# Patient Record
Sex: MALE | Race: WHITE | NOT HISPANIC OR LATINO | URBAN - METROPOLITAN AREA
[De-identification: names, ages, dates, MRNs, and addresses within clinical notes are randomized per-mention and may not be internally consistent; named-entity substitution may affect disease eponyms.]

---

## 2018-08-15 ENCOUNTER — OUTPATIENT (OUTPATIENT)
Dept: OUTPATIENT SERVICES | Facility: HOSPITAL | Age: 83
LOS: 1 days | Discharge: HOME | End: 2018-08-15
Payer: MEDICARE

## 2018-08-15 DIAGNOSIS — C34.90 MALIGNANT NEOPLASM OF UNSPECIFIED PART OF UNSPECIFIED BRONCHUS OR LUNG: ICD-10-CM

## 2018-08-15 PROBLEM — Z00.00 ENCOUNTER FOR PREVENTIVE HEALTH EXAMINATION: Status: ACTIVE | Noted: 2018-08-15

## 2018-08-16 ENCOUNTER — RESULT REVIEW (OUTPATIENT)
Age: 83
End: 2018-08-16

## 2018-08-16 ENCOUNTER — APPOINTMENT (OUTPATIENT)
Dept: HEMATOLOGY ONCOLOGY | Facility: CLINIC | Age: 83
End: 2018-08-16
Payer: MEDICARE

## 2018-08-16 VITALS
RESPIRATION RATE: 16 BRPM | HEART RATE: 77 BPM | OXYGEN SATURATION: 97 % | BODY MASS INDEX: 23.57 KG/M2 | SYSTOLIC BLOOD PRESSURE: 115 MMHG | TEMPERATURE: 98.5 F | WEIGHT: 173.99 LBS | DIASTOLIC BLOOD PRESSURE: 69 MMHG | HEIGHT: 72 IN

## 2018-08-16 DIAGNOSIS — Z85.828 PERSONAL HISTORY OF OTHER MALIGNANT NEOPLASM OF SKIN: ICD-10-CM

## 2018-08-16 DIAGNOSIS — Z78.9 OTHER SPECIFIED HEALTH STATUS: ICD-10-CM

## 2018-08-16 LAB
ALBUMIN SERPL ELPH-MCNC: 4.6 G/DL
ALP BLD-CCNC: 287 U/L
ALT SERPL-CCNC: 16 U/L
ANION GAP SERPL CALC-SCNC: 15 MMOL/L
AST SERPL-CCNC: 17 U/L
BASOPHILS # BLD AUTO: 0 K/UL — SIGNIFICANT CHANGE UP (ref 0–0.2)
BASOPHILS NFR BLD AUTO: 0.4 % — SIGNIFICANT CHANGE UP (ref 0–2)
BILIRUB SERPL-MCNC: 0.6 MG/DL
BUN SERPL-MCNC: 21 MG/DL
CALCIUM SERPL-MCNC: 9.8 MG/DL
CEA SERPL-MCNC: 18 NG/ML
CHLORIDE SERPL-SCNC: 102 MMOL/L
CO2 SERPL-SCNC: 25 MMOL/L
CREAT SERPL-MCNC: 1.01 MG/DL
EOSINOPHIL # BLD AUTO: 0.1 K/UL — SIGNIFICANT CHANGE UP (ref 0–0.5)
EOSINOPHIL NFR BLD AUTO: 1.4 % — SIGNIFICANT CHANGE UP (ref 0–6)
GLUCOSE SERPL-MCNC: 97 MG/DL
HCT VFR BLD CALC: 41.8 % — SIGNIFICANT CHANGE UP (ref 39–50)
HGB BLD-MCNC: 14.8 G/DL — SIGNIFICANT CHANGE UP (ref 13–17)
LYMPHOCYTES # BLD AUTO: 1.6 K/UL — SIGNIFICANT CHANGE UP (ref 1–3.3)
LYMPHOCYTES # BLD AUTO: 17.9 % — SIGNIFICANT CHANGE UP (ref 13–44)
MCHC RBC-ENTMCNC: 32.9 PG — SIGNIFICANT CHANGE UP (ref 27–34)
MCHC RBC-ENTMCNC: 35.4 G/DL — SIGNIFICANT CHANGE UP (ref 32–36)
MCV RBC AUTO: 93 FL — SIGNIFICANT CHANGE UP (ref 80–100)
MONOCYTES # BLD AUTO: 0.9 K/UL — SIGNIFICANT CHANGE UP (ref 0–0.9)
MONOCYTES NFR BLD AUTO: 10.1 % — SIGNIFICANT CHANGE UP (ref 2–14)
NEUTROPHILS # BLD AUTO: 6.1 K/UL — SIGNIFICANT CHANGE UP (ref 1.8–7.4)
NEUTROPHILS NFR BLD AUTO: 70.2 % — SIGNIFICANT CHANGE UP (ref 43–77)
PLATELET # BLD AUTO: 280 K/UL — SIGNIFICANT CHANGE UP (ref 150–400)
POTASSIUM SERPL-SCNC: 4.9 MMOL/L
PROT SERPL-MCNC: 7 G/DL
RBC # BLD: 4.49 M/UL — SIGNIFICANT CHANGE UP (ref 4.2–5.8)
RBC # FLD: 11.6 % — SIGNIFICANT CHANGE UP (ref 10.3–14.5)
SODIUM SERPL-SCNC: 142 MMOL/L
WBC # BLD: 8.7 K/UL — SIGNIFICANT CHANGE UP (ref 3.8–10.5)
WBC # FLD AUTO: 8.7 K/UL — SIGNIFICANT CHANGE UP (ref 3.8–10.5)

## 2018-08-16 PROCEDURE — 99205 OFFICE O/P NEW HI 60 MIN: CPT

## 2018-08-16 RX ORDER — IBUPROFEN 200 MG/1
TABLET, COATED ORAL
Refills: 0 | Status: ACTIVE | COMMUNITY

## 2018-08-16 RX ORDER — MULTIVIT-MIN/FA/LYCOPEN/LUTEIN .4-300-25
TABLET ORAL
Refills: 0 | Status: ACTIVE | COMMUNITY

## 2018-08-16 RX ORDER — AMLODIPINE BESYLATE 2.5 MG/1
2.5 TABLET ORAL
Refills: 0 | Status: ACTIVE | COMMUNITY

## 2018-08-21 ENCOUNTER — RESULT REVIEW (OUTPATIENT)
Age: 83
End: 2018-08-21

## 2018-08-21 PROCEDURE — 88321 CONSLTJ&REPRT SLD PREP ELSWR: CPT

## 2018-08-22 LAB — SURGICAL PATHOLOGY STUDY: SIGNIFICANT CHANGE UP

## 2018-08-23 ENCOUNTER — OUTPATIENT (OUTPATIENT)
Dept: OUTPATIENT SERVICES | Facility: HOSPITAL | Age: 83
LOS: 1 days | Discharge: HOME | End: 2018-08-23

## 2018-08-23 ENCOUNTER — APPOINTMENT (OUTPATIENT)
Dept: RADIATION ONCOLOGY | Facility: CLINIC | Age: 83
End: 2018-08-23
Payer: MEDICARE

## 2018-08-23 VITALS
BODY MASS INDEX: 23.57 KG/M2 | RESPIRATION RATE: 16 BRPM | SYSTOLIC BLOOD PRESSURE: 117 MMHG | HEIGHT: 72 IN | HEART RATE: 81 BPM | TEMPERATURE: 98.5 F | WEIGHT: 174 LBS | DIASTOLIC BLOOD PRESSURE: 75 MMHG | OXYGEN SATURATION: 96 %

## 2018-08-23 PROCEDURE — 99205 OFFICE O/P NEW HI 60 MIN: CPT | Mod: 25

## 2018-08-23 RX ORDER — ASPIRIN 81 MG
81 TABLET,CHEWABLE ORAL
Refills: 0 | Status: ACTIVE | COMMUNITY

## 2018-08-31 ENCOUNTER — LABORATORY RESULT (OUTPATIENT)
Age: 83
End: 2018-08-31

## 2018-08-31 ENCOUNTER — APPOINTMENT (OUTPATIENT)
Dept: HEMATOLOGY ONCOLOGY | Facility: CLINIC | Age: 83
End: 2018-08-31
Payer: MEDICARE

## 2018-08-31 ENCOUNTER — RESULT REVIEW (OUTPATIENT)
Age: 83
End: 2018-08-31

## 2018-08-31 ENCOUNTER — APPOINTMENT (OUTPATIENT)
Dept: DERMATOLOGY | Facility: CLINIC | Age: 83
End: 2018-08-31
Payer: MEDICARE

## 2018-08-31 VITALS
TEMPERATURE: 98 F | DIASTOLIC BLOOD PRESSURE: 69 MMHG | RESPIRATION RATE: 16 BRPM | HEART RATE: 89 BPM | OXYGEN SATURATION: 98 % | SYSTOLIC BLOOD PRESSURE: 127 MMHG | BODY MASS INDEX: 22.42 KG/M2 | WEIGHT: 165.32 LBS

## 2018-08-31 VITALS
WEIGHT: 165 LBS | DIASTOLIC BLOOD PRESSURE: 62 MMHG | SYSTOLIC BLOOD PRESSURE: 122 MMHG | BODY MASS INDEX: 22.35 KG/M2 | HEIGHT: 72 IN

## 2018-08-31 DIAGNOSIS — D48.5 NEOPLASM OF UNCERTAIN BEHAVIOR OF SKIN: ICD-10-CM

## 2018-08-31 LAB
BASOPHILS # BLD AUTO: 0 K/UL — SIGNIFICANT CHANGE UP (ref 0–0.2)
BASOPHILS NFR BLD AUTO: 0.7 % — SIGNIFICANT CHANGE UP (ref 0–2)
EOSINOPHIL # BLD AUTO: 0.2 K/UL — SIGNIFICANT CHANGE UP (ref 0–0.5)
EOSINOPHIL NFR BLD AUTO: 3.8 % — SIGNIFICANT CHANGE UP (ref 0–6)
HCT VFR BLD CALC: 42.2 % — SIGNIFICANT CHANGE UP (ref 39–50)
HGB BLD-MCNC: 14.2 G/DL — SIGNIFICANT CHANGE UP (ref 13–17)
LYMPHOCYTES # BLD AUTO: 1.4 K/UL — SIGNIFICANT CHANGE UP (ref 1–3.3)
LYMPHOCYTES # BLD AUTO: 27.8 % — SIGNIFICANT CHANGE UP (ref 13–44)
MCHC RBC-ENTMCNC: 31.7 PG — SIGNIFICANT CHANGE UP (ref 27–34)
MCHC RBC-ENTMCNC: 33.6 G/DL — SIGNIFICANT CHANGE UP (ref 32–36)
MCV RBC AUTO: 94.5 FL — SIGNIFICANT CHANGE UP (ref 80–100)
MONOCYTES # BLD AUTO: 0.6 K/UL — SIGNIFICANT CHANGE UP (ref 0–0.9)
MONOCYTES NFR BLD AUTO: 12.8 % — SIGNIFICANT CHANGE UP (ref 2–14)
NEUTROPHILS # BLD AUTO: 2.7 K/UL — SIGNIFICANT CHANGE UP (ref 1.8–7.4)
NEUTROPHILS NFR BLD AUTO: 54.8 % — SIGNIFICANT CHANGE UP (ref 43–77)
PLATELET # BLD AUTO: 173 K/UL — SIGNIFICANT CHANGE UP (ref 150–400)
RBC # BLD: 4.47 M/UL — SIGNIFICANT CHANGE UP (ref 4.2–5.8)
RBC # FLD: 11.8 % — SIGNIFICANT CHANGE UP (ref 10.3–14.5)
WBC # BLD: 5 K/UL — SIGNIFICANT CHANGE UP (ref 3.8–10.5)
WBC # FLD AUTO: 5 K/UL — SIGNIFICANT CHANGE UP (ref 3.8–10.5)

## 2018-08-31 PROCEDURE — 11100 BX SKIN SUBCUTANEOUS&/MUCOUS MEMBRANE 1 LESION: CPT | Mod: 59

## 2018-08-31 PROCEDURE — 17000 DESTRUCT PREMALG LESION: CPT

## 2018-08-31 PROCEDURE — 99215 OFFICE O/P EST HI 40 MIN: CPT

## 2018-08-31 PROCEDURE — 99202 OFFICE O/P NEW SF 15 MIN: CPT | Mod: 25

## 2018-08-31 PROCEDURE — 17003 DESTRUCT PREMALG LES 2-14: CPT

## 2018-08-31 RX ORDER — QUINAPRIL HYDROCHLORIDE 20 MG/1
20 TABLET, FILM COATED ORAL DAILY
Qty: 30 | Refills: 0 | Status: ACTIVE | COMMUNITY
Start: 2018-08-31 | End: 1900-01-01

## 2018-08-31 RX ORDER — FINASTERIDE 5 MG/1
5 TABLET, FILM COATED ORAL DAILY
Qty: 30 | Refills: 0 | Status: ACTIVE | COMMUNITY
Start: 2018-08-31 | End: 1900-01-01

## 2018-08-31 RX ORDER — TAMSULOSIN HYDROCHLORIDE 0.4 MG/1
0.4 CAPSULE ORAL
Qty: 30 | Refills: 0 | Status: ACTIVE | COMMUNITY
Start: 2018-08-31 | End: 1900-01-01

## 2018-08-31 RX ORDER — ATORVASTATIN CALCIUM 40 MG/1
40 TABLET, FILM COATED ORAL
Qty: 30 | Refills: 0 | Status: ACTIVE | COMMUNITY
Start: 1900-01-01 | End: 1900-01-01

## 2018-09-05 LAB
ALBUMIN SERPL ELPH-MCNC: 4.2 G/DL
ALP BLD-CCNC: 298 U/L
ALT SERPL-CCNC: 25 U/L
ANION GAP SERPL CALC-SCNC: 12 MMOL/L
AST SERPL-CCNC: 17 U/L
BILIRUB SERPL-MCNC: 0.4 MG/DL
BUN SERPL-MCNC: 23 MG/DL
CALCIUM SERPL-MCNC: 9.5 MG/DL
CEA SERPL-MCNC: 41.5 NG/ML
CHLORIDE SERPL-SCNC: 105 MMOL/L
CO2 SERPL-SCNC: 26 MMOL/L
CREAT SERPL-MCNC: 1.26 MG/DL
GLUCOSE SERPL-MCNC: 91 MG/DL
POTASSIUM SERPL-SCNC: 5 MMOL/L
PROT SERPL-MCNC: 6.8 G/DL
SODIUM SERPL-SCNC: 143 MMOL/L

## 2018-09-10 ENCOUNTER — RESULT REVIEW (OUTPATIENT)
Age: 83
End: 2018-09-10

## 2018-09-10 ENCOUNTER — APPOINTMENT (OUTPATIENT)
Dept: HEMATOLOGY ONCOLOGY | Facility: CLINIC | Age: 83
End: 2018-09-10

## 2018-09-10 LAB
BASOPHILS # BLD AUTO: 0 K/UL — SIGNIFICANT CHANGE UP (ref 0–0.2)
BASOPHILS NFR BLD AUTO: 0.2 % — SIGNIFICANT CHANGE UP (ref 0–2)
EOSINOPHIL # BLD AUTO: 0.6 K/UL — HIGH (ref 0–0.5)
EOSINOPHIL NFR BLD AUTO: 6.9 % — HIGH (ref 0–6)
HCT VFR BLD CALC: 39.8 % — SIGNIFICANT CHANGE UP (ref 39–50)
HGB BLD-MCNC: 13.2 G/DL — SIGNIFICANT CHANGE UP (ref 13–17)
LYMPHOCYTES # BLD AUTO: 0.8 K/UL — LOW (ref 1–3.3)
LYMPHOCYTES # BLD AUTO: 9.3 % — LOW (ref 13–44)
MCHC RBC-ENTMCNC: 31.3 PG — SIGNIFICANT CHANGE UP (ref 27–34)
MCHC RBC-ENTMCNC: 33.1 G/DL — SIGNIFICANT CHANGE UP (ref 32–36)
MCV RBC AUTO: 94.6 FL — SIGNIFICANT CHANGE UP (ref 80–100)
MONOCYTES # BLD AUTO: 1.2 K/UL — HIGH (ref 0–0.9)
MONOCYTES NFR BLD AUTO: 14.1 % — HIGH (ref 2–14)
NEUTROPHILS # BLD AUTO: 5.7 K/UL — SIGNIFICANT CHANGE UP (ref 1.8–7.4)
NEUTROPHILS NFR BLD AUTO: 69.5 % — SIGNIFICANT CHANGE UP (ref 43–77)
PLATELET # BLD AUTO: 166 K/UL — SIGNIFICANT CHANGE UP (ref 150–400)
RBC # BLD: 4.21 M/UL — SIGNIFICANT CHANGE UP (ref 4.2–5.8)
RBC # FLD: 11.8 % — SIGNIFICANT CHANGE UP (ref 10.3–14.5)
WBC # BLD: 8.2 K/UL — SIGNIFICANT CHANGE UP (ref 3.8–10.5)
WBC # FLD AUTO: 8.2 K/UL — SIGNIFICANT CHANGE UP (ref 3.8–10.5)

## 2018-09-13 LAB
ALBUMIN SERPL ELPH-MCNC: 4 G/DL
ALP BLD-CCNC: 231 U/L
ALT SERPL-CCNC: 23 U/L
ANION GAP SERPL CALC-SCNC: 13 MMOL/L
AST SERPL-CCNC: 16 U/L
BILIRUB SERPL-MCNC: 0.7 MG/DL
BUN SERPL-MCNC: 21 MG/DL
CALCIUM SERPL-MCNC: 9.1 MG/DL
CHLORIDE SERPL-SCNC: 101 MMOL/L
CO2 SERPL-SCNC: 25 MMOL/L
CREAT SERPL-MCNC: 1.31 MG/DL
GLUCOSE SERPL-MCNC: 105 MG/DL
POTASSIUM SERPL-SCNC: 4.7 MMOL/L
PROT SERPL-MCNC: 6.3 G/DL
SODIUM SERPL-SCNC: 139 MMOL/L

## 2018-09-17 ENCOUNTER — OUTPATIENT (OUTPATIENT)
Dept: OUTPATIENT SERVICES | Facility: HOSPITAL | Age: 83
LOS: 1 days | Discharge: HOME | End: 2018-09-17

## 2018-09-17 DIAGNOSIS — C34.90 MALIGNANT NEOPLASM OF UNSPECIFIED PART OF UNSPECIFIED BRONCHUS OR LUNG: ICD-10-CM

## 2018-09-21 ENCOUNTER — RESULT REVIEW (OUTPATIENT)
Age: 83
End: 2018-09-21

## 2018-09-21 ENCOUNTER — APPOINTMENT (OUTPATIENT)
Dept: HEMATOLOGY ONCOLOGY | Facility: CLINIC | Age: 83
End: 2018-09-21
Payer: MEDICARE

## 2018-09-21 VITALS
TEMPERATURE: 99.6 F | SYSTOLIC BLOOD PRESSURE: 128 MMHG | WEIGHT: 163.14 LBS | HEART RATE: 98 BPM | RESPIRATION RATE: 16 BRPM | BODY MASS INDEX: 22.13 KG/M2 | OXYGEN SATURATION: 97 % | DIASTOLIC BLOOD PRESSURE: 71 MMHG

## 2018-09-21 LAB
BASOPHILS # BLD AUTO: 0 K/UL — SIGNIFICANT CHANGE UP (ref 0–0.2)
BASOPHILS NFR BLD AUTO: 0.6 % — SIGNIFICANT CHANGE UP (ref 0–2)
EOSINOPHIL # BLD AUTO: 0.6 K/UL — HIGH (ref 0–0.5)
EOSINOPHIL NFR BLD AUTO: 6.3 % — HIGH (ref 0–6)
HCT VFR BLD CALC: 41.6 % — SIGNIFICANT CHANGE UP (ref 39–50)
HGB BLD-MCNC: 13.7 G/DL — SIGNIFICANT CHANGE UP (ref 13–17)
LYMPHOCYTES # BLD AUTO: 1.1 K/UL — SIGNIFICANT CHANGE UP (ref 1–3.3)
LYMPHOCYTES # BLD AUTO: 12.3 % — LOW (ref 13–44)
MCHC RBC-ENTMCNC: 31.1 PG — SIGNIFICANT CHANGE UP (ref 27–34)
MCHC RBC-ENTMCNC: 32.9 G/DL — SIGNIFICANT CHANGE UP (ref 32–36)
MCV RBC AUTO: 94.5 FL — SIGNIFICANT CHANGE UP (ref 80–100)
MONOCYTES # BLD AUTO: 1 K/UL — HIGH (ref 0–0.9)
MONOCYTES NFR BLD AUTO: 11.6 % — SIGNIFICANT CHANGE UP (ref 2–14)
NEUTROPHILS # BLD AUTO: 6.2 K/UL — SIGNIFICANT CHANGE UP (ref 1.8–7.4)
NEUTROPHILS NFR BLD AUTO: 69.3 % — SIGNIFICANT CHANGE UP (ref 43–77)
PLATELET # BLD AUTO: 217 K/UL — SIGNIFICANT CHANGE UP (ref 150–400)
RBC # BLD: 4.4 M/UL — SIGNIFICANT CHANGE UP (ref 4.2–5.8)
RBC # FLD: 11.7 % — SIGNIFICANT CHANGE UP (ref 10.3–14.5)
WBC # BLD: 9 K/UL — SIGNIFICANT CHANGE UP (ref 3.8–10.5)
WBC # FLD AUTO: 9 K/UL — SIGNIFICANT CHANGE UP (ref 3.8–10.5)

## 2018-09-21 PROCEDURE — 99214 OFFICE O/P EST MOD 30 MIN: CPT

## 2018-09-25 LAB
ALBUMIN SERPL ELPH-MCNC: 4 G/DL
ALP BLD-CCNC: 186 U/L
ALT SERPL-CCNC: 27 U/L
ANION GAP SERPL CALC-SCNC: 14 MMOL/L
AST SERPL-CCNC: 17 U/L
BILIRUB SERPL-MCNC: 0.5 MG/DL
BUN SERPL-MCNC: 16 MG/DL
CALCIUM SERPL-MCNC: 9.4 MG/DL
CEA SERPL-MCNC: 38.8 NG/ML
CHLORIDE SERPL-SCNC: 102 MMOL/L
CO2 SERPL-SCNC: 25 MMOL/L
CREAT SERPL-MCNC: 1.16 MG/DL
GLUCOSE SERPL-MCNC: 106 MG/DL
POTASSIUM SERPL-SCNC: 5.2 MMOL/L
PROT SERPL-MCNC: 7.2 G/DL
SODIUM SERPL-SCNC: 141 MMOL/L

## 2018-10-01 ENCOUNTER — APPOINTMENT (OUTPATIENT)
Dept: DERMATOLOGY | Facility: CLINIC | Age: 83
End: 2018-10-01
Payer: MEDICARE

## 2018-10-01 PROCEDURE — 17263 DSTRJ MAL LES T/A/L 2.1-3.0: CPT

## 2018-10-12 ENCOUNTER — RESULT REVIEW (OUTPATIENT)
Age: 83
End: 2018-10-12

## 2018-10-12 ENCOUNTER — APPOINTMENT (OUTPATIENT)
Dept: INFUSION THERAPY | Facility: HOSPITAL | Age: 83
End: 2018-10-12

## 2018-10-12 ENCOUNTER — LABORATORY RESULT (OUTPATIENT)
Age: 83
End: 2018-10-12

## 2018-10-12 ENCOUNTER — APPOINTMENT (OUTPATIENT)
Dept: HEMATOLOGY ONCOLOGY | Facility: CLINIC | Age: 83
End: 2018-10-12
Payer: MEDICARE

## 2018-10-12 VITALS
TEMPERATURE: 98.2 F | WEIGHT: 158.73 LBS | HEART RATE: 97 BPM | BODY MASS INDEX: 21.53 KG/M2 | DIASTOLIC BLOOD PRESSURE: 70 MMHG | RESPIRATION RATE: 16 BRPM | SYSTOLIC BLOOD PRESSURE: 120 MMHG | OXYGEN SATURATION: 98 %

## 2018-10-12 LAB
BASOPHILS # BLD AUTO: 0 K/UL — SIGNIFICANT CHANGE UP (ref 0–0.2)
BASOPHILS NFR BLD AUTO: 0.6 % — SIGNIFICANT CHANGE UP (ref 0–2)
BUN SERPL-MCNC: 29 MG/DL — HIGH (ref 7–23)
CA-I BLDA-SCNC: 1.23 MMOL/L — SIGNIFICANT CHANGE UP (ref 1.12–1.3)
CHLORIDE SERPL-SCNC: 103 MMOL/L — SIGNIFICANT CHANGE UP (ref 96–108)
CO2 SERPL-SCNC: 26 MMOL/L — SIGNIFICANT CHANGE UP (ref 22–31)
CREAT SERPL-MCNC: 1.2 MG/DL — SIGNIFICANT CHANGE UP (ref 0.5–1.3)
EOSINOPHIL # BLD AUTO: 0.1 K/UL — SIGNIFICANT CHANGE UP (ref 0–0.5)
EOSINOPHIL NFR BLD AUTO: 1.2 % — SIGNIFICANT CHANGE UP (ref 0–6)
GLUCOSE SERPL-MCNC: 117 MG/DL — HIGH (ref 70–99)
HCT VFR BLD CALC: 36.4 % — LOW (ref 39–50)
HGB BLD-MCNC: 12.4 G/DL — LOW (ref 13–17)
LYMPHOCYTES # BLD AUTO: 1.3 K/UL — SIGNIFICANT CHANGE UP (ref 1–3.3)
LYMPHOCYTES # BLD AUTO: 16.1 % — SIGNIFICANT CHANGE UP (ref 13–44)
MCHC RBC-ENTMCNC: 32.1 PG — SIGNIFICANT CHANGE UP (ref 27–34)
MCHC RBC-ENTMCNC: 34.1 G/DL — SIGNIFICANT CHANGE UP (ref 32–36)
MCV RBC AUTO: 94.1 FL — SIGNIFICANT CHANGE UP (ref 80–100)
MONOCYTES # BLD AUTO: 1.3 K/UL — HIGH (ref 0–0.9)
MONOCYTES NFR BLD AUTO: 15.5 % — HIGH (ref 2–14)
NEUTROPHILS # BLD AUTO: 5.4 K/UL — SIGNIFICANT CHANGE UP (ref 1.8–7.4)
NEUTROPHILS NFR BLD AUTO: 66.6 % — SIGNIFICANT CHANGE UP (ref 43–77)
PLATELET # BLD AUTO: 248 K/UL — SIGNIFICANT CHANGE UP (ref 150–400)
POTASSIUM SERPL-MCNC: 4.7 MMOL/L — SIGNIFICANT CHANGE UP (ref 3.5–5.3)
POTASSIUM SERPL-SCNC: 4.7 MMOL/L — SIGNIFICANT CHANGE UP (ref 3.5–5.3)
RBC # BLD: 3.87 M/UL — LOW (ref 4.2–5.8)
RBC # FLD: 12 % — SIGNIFICANT CHANGE UP (ref 10.3–14.5)
SODIUM SERPL-SCNC: 139 MMOL/L — SIGNIFICANT CHANGE UP (ref 135–145)
WBC # BLD: 8.1 K/UL — SIGNIFICANT CHANGE UP (ref 3.8–10.5)
WBC # FLD AUTO: 8.1 K/UL — SIGNIFICANT CHANGE UP (ref 3.8–10.5)

## 2018-10-12 PROCEDURE — 99214 OFFICE O/P EST MOD 30 MIN: CPT

## 2018-10-15 ENCOUNTER — APPOINTMENT (OUTPATIENT)
Dept: NUCLEAR MEDICINE | Facility: IMAGING CENTER | Age: 83
End: 2018-10-15
Payer: MEDICARE

## 2018-10-15 ENCOUNTER — OUTPATIENT (OUTPATIENT)
Dept: OUTPATIENT SERVICES | Facility: HOSPITAL | Age: 83
LOS: 1 days | End: 2018-10-15
Payer: MEDICARE

## 2018-10-15 DIAGNOSIS — C34.12 MALIGNANT NEOPLASM OF UPPER LOBE, LEFT BRONCHUS OR LUNG: ICD-10-CM

## 2018-10-15 DIAGNOSIS — E86.0 DEHYDRATION: ICD-10-CM

## 2018-10-15 PROCEDURE — 78815 PET IMAGE W/CT SKULL-THIGH: CPT | Mod: 26,PS

## 2018-10-15 PROCEDURE — 78815 PET IMAGE W/CT SKULL-THIGH: CPT

## 2018-10-15 PROCEDURE — A9552: CPT

## 2018-10-17 ENCOUNTER — APPOINTMENT (OUTPATIENT)
Dept: HEMATOLOGY ONCOLOGY | Facility: CLINIC | Age: 83
End: 2018-10-17
Payer: MEDICARE

## 2018-10-17 VITALS
TEMPERATURE: 98.1 F | WEIGHT: 163.14 LBS | HEART RATE: 82 BPM | BODY MASS INDEX: 22.13 KG/M2 | RESPIRATION RATE: 16 BRPM | SYSTOLIC BLOOD PRESSURE: 114 MMHG | DIASTOLIC BLOOD PRESSURE: 69 MMHG | OXYGEN SATURATION: 100 %

## 2018-10-17 PROCEDURE — 99215 OFFICE O/P EST HI 40 MIN: CPT

## 2018-10-18 ENCOUNTER — CHART COPY (OUTPATIENT)
Age: 83
End: 2018-10-18

## 2018-10-25 ENCOUNTER — FORM ENCOUNTER (OUTPATIENT)
Age: 83
End: 2018-10-25

## 2018-10-26 ENCOUNTER — APPOINTMENT (OUTPATIENT)
Dept: HEMATOLOGY ONCOLOGY | Facility: CLINIC | Age: 83
End: 2018-10-26

## 2018-10-26 ENCOUNTER — OUTPATIENT (OUTPATIENT)
Dept: OUTPATIENT SERVICES | Facility: HOSPITAL | Age: 83
LOS: 1 days | End: 2018-10-26
Payer: MEDICARE

## 2018-10-26 ENCOUNTER — APPOINTMENT (OUTPATIENT)
Dept: MRI IMAGING | Facility: CLINIC | Age: 83
End: 2018-10-26
Payer: MEDICARE

## 2018-10-26 DIAGNOSIS — C79.31 SECONDARY MALIGNANT NEOPLASM OF BRAIN: ICD-10-CM

## 2018-10-26 DIAGNOSIS — C79.49 SECONDARY MALIGNANT NEOPLASM OF OTHER PARTS OF NERVOUS SYSTEM: ICD-10-CM

## 2018-10-26 PROCEDURE — A9585: CPT

## 2018-10-26 PROCEDURE — 70553 MRI BRAIN STEM W/O & W/DYE: CPT | Mod: 26

## 2018-10-26 PROCEDURE — 70553 MRI BRAIN STEM W/O & W/DYE: CPT

## 2018-11-02 ENCOUNTER — OUTPATIENT (OUTPATIENT)
Dept: OUTPATIENT SERVICES | Facility: HOSPITAL | Age: 83
LOS: 1 days | Discharge: HOME | End: 2018-11-02

## 2018-11-02 DIAGNOSIS — C34.90 MALIGNANT NEOPLASM OF UNSPECIFIED PART OF UNSPECIFIED BRONCHUS OR LUNG: ICD-10-CM

## 2018-11-06 ENCOUNTER — APPOINTMENT (OUTPATIENT)
Dept: RADIATION ONCOLOGY | Facility: CLINIC | Age: 83
End: 2018-11-06
Payer: MEDICARE

## 2018-11-06 VITALS
DIASTOLIC BLOOD PRESSURE: 67 MMHG | WEIGHT: 163.91 LBS | OXYGEN SATURATION: 97 % | HEART RATE: 90 BPM | RESPIRATION RATE: 14 BRPM | SYSTOLIC BLOOD PRESSURE: 126 MMHG | TEMPERATURE: 98.24 F | BODY MASS INDEX: 22.23 KG/M2

## 2018-11-06 PROCEDURE — 99213 OFFICE O/P EST LOW 20 MIN: CPT

## 2018-11-06 RX ORDER — CODEINE SULFATE 15 MG/1
15 TABLET ORAL
Refills: 0 | Status: DISCONTINUED | COMMUNITY
Start: 2018-08-16 | End: 2018-11-06

## 2018-11-06 RX ORDER — AMLODIPINE BESYLATE 5 MG/1
5 TABLET ORAL DAILY
Qty: 30 | Refills: 0 | Status: DISCONTINUED | COMMUNITY
Start: 2018-08-31 | End: 2018-11-06

## 2018-11-06 NOTE — PHYSICAL EXAM
[No Focal Deficits] : no focal deficits [Normal] : oriented to person, place and time, the affect was normal, the mood was normal and not anxious [de-identified] : hoarseness [de-identified] : pain in the neck with neck flexion [de-identified] : ambulating with rolling walker [de-identified] : a 2 cm flesh colored, irregular, verrucous lesion on the scalp near prior excision site

## 2018-11-06 NOTE — REVIEW OF SYSTEMS
[Hoarseness] : hoarseness [Cough] : cough [Difficulty Walking] : difficulty walking [Disturbance Of Gait] : gait disturbance [Negative] : Genitourinary [Abdominal Pain] : no abdominal pain [Vomiting] : no vomiting [Constipation] : no constipation [Diarrhea] : no diarrhea [Muscle Weakness] : no muscle weakness [Confused] : no confusion [Dizziness] : no dizziness [Fainting] : no fainting [Easy Bleeding] : no tendency for easy bleeding [Easy Bruising] : no tendency for easy bruising [FreeTextEntry6] : cough improving [FreeTextEntry8] : urinary urgency [de-identified] : prior skin squamous cell on the scalp s/p excision  [de-identified] : balance still bad, has been going on for 5 years

## 2018-11-06 NOTE — REVIEW OF SYSTEMS
[Hoarseness] : hoarseness [Cough] : cough [Difficulty Walking] : difficulty walking [Disturbance Of Gait] : gait disturbance [Negative] : Genitourinary [Abdominal Pain] : no abdominal pain [Vomiting] : no vomiting [Constipation] : no constipation [Diarrhea] : no diarrhea [Muscle Weakness] : no muscle weakness [Confused] : no confusion [Dizziness] : no dizziness [Fainting] : no fainting [Easy Bleeding] : no tendency for easy bleeding [Easy Bruising] : no tendency for easy bruising [FreeTextEntry6] : cough improving [FreeTextEntry8] : urinary urgency [de-identified] : prior skin squamous cell on the scalp s/p excision  [de-identified] : balance still bad, has been going on for 5 years

## 2018-11-06 NOTE — HISTORY OF PRESENT ILLNESS
[FreeTextEntry1] : Mr. Durga Sebastian presents today for follow up. \par \par \par ONCOLOGY HISTORY\par Mr. 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 He presents today for consideration for radiation therapy. 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 \par \par CT of the neck on 7/6/18 showed a WILFREDO spiculated mass, 3.9 C 3.4 cm with extension into the mediastinum, likely impinging on the course of the left recurrent laryngeal nerve.  There was a lytic lesion at C7 with bulging of the posterior margin of the vertebral body suspicious for metastatic disease, extension into the spinal cord cannot be completely excluded. Question of a C3 lesion. \par \par Biopsy of a right iliac crest bone lesion on 8/3/18 showed metastatic adenocarcinoma consistent with lung primary. L58R mutation detected. EGFR mutation and TR53 mutation detected. \par \par PET scan 7/25/18 showed widespread tumor burden of PET avid bony metastasis. \par There was a 5 X 3 X 3 cm PET avid mass within the WILFREDO suspicious for a primary lung carcinoma. \par There were bulky hypermetabolic PET avid lymph nodes identified within the preaortic outline and the AP window suspicious for metabolic lymphadenopathy. \par There were at least 4 hypermetabolic mets identified within the liver. \par \par He underwent brain MRI on 8/14/18 which showed multiple supra and infratentorial enhancing lesions compatible with metastatic disease. At least 15 supratentorial lesions, some with areas of central necrosis. Largest in the right posterior frontal lobe measuring 1.1 cm in greatest diameter. Many with mild surrounding vasogenci edema. Infratentorially there were at least 6 enhancing lesions involving the cerebellar hemispheres with mild surrounding vasogenic edema. 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 \par At the time of initial consult he had not neurologic symptoms, and had recently started tagrisso. \par I planned to evaluate after initial treatmetn with tagrisso, which has good CNS penetration. \par He was advised to follow up with dermatology regarding his scalp lesions\par \par 11/6/18- Mr. Sebastian presents today for follow up, Since he saw us last he had a brief pause in osimertinib due to diarrhea. \par \par MRI 10/26/18 showed one small residual nodular enhancing focus along the peripheral righ tfrontal cortex consistent with brain mets. In comparison to prior exam 8/14/18 the multiple enhancing supra and infratentorial nodules have resolved. \par \par PET 10/15/18 showed there was interval decrease in size and metabolism of a left upper lobe lung mass and FDG-avid prevascular/AP window conglomerate lymphadenopathy, compatible with a partial response to interval therapy. \par  Near total resolution of multiple FDG-avid hepatic metastases. \par Several FDG-avid osseous metastases, markedly decreased in number and \par metabolism. \par New, mildly FDG-avid bilateral lower cervical, mediastinal and hilar \par lymph nodes are nonspecific. Cervical lymph nodes may be further evaluated \par with ultrasound-guided percutaneous needle biopsy. Resolution of previously \par seen hypermetabolic right external iliac lymph node. \par \par 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 He denies numbness or tingling. Notes swallowing has much improved. He has difficulty with balance, which he has had for around 4-5 years. This is is stable. \par Overall feeling much better from prior.

## 2018-11-06 NOTE — REASON FOR VISIT
[Consideration of Curative Therapy] : consideration of curative therapy for [Brain Metastasis] : brain metastasis [Lung Cancer] : lung cancer [Family Member] : family member

## 2018-11-06 NOTE — VITALS
[Maximal Pain Intensity: 0/10] : 0/10 [Least Pain Intensity: 0/10] : 0/10 [NoTreatment Scheduled] : no treatment scheduled [80: Normal activity with effort; some signs or symptoms of disease.] : 80: Normal activity with effort; some signs or symptoms of disease.  [ECOG Performance Status: 2 - Ambulatory and capable of all self care but unable to carry out any work activities] : Performance Status: 2 - Ambulatory and capable of all self care but unable to carry out any work activities. Up and about more than 50% of waking hours

## 2018-11-06 NOTE — PHYSICAL EXAM
[No Focal Deficits] : no focal deficits [Normal] : oriented to person, place and time, the affect was normal, the mood was normal and not anxious [de-identified] : hoarseness [de-identified] : pain in the neck with neck flexion [de-identified] : ambulating with rolling walker [de-identified] : a 2 cm flesh colored, irregular, verrucous lesion on the scalp near prior excision site

## 2018-11-13 ENCOUNTER — RESULT REVIEW (OUTPATIENT)
Age: 83
End: 2018-11-13

## 2018-11-13 ENCOUNTER — APPOINTMENT (OUTPATIENT)
Dept: HEMATOLOGY ONCOLOGY | Facility: CLINIC | Age: 83
End: 2018-11-13
Payer: MEDICARE

## 2018-11-13 VITALS
RESPIRATION RATE: 16 BRPM | DIASTOLIC BLOOD PRESSURE: 69 MMHG | TEMPERATURE: 98 F | OXYGEN SATURATION: 98 % | SYSTOLIC BLOOD PRESSURE: 126 MMHG | WEIGHT: 163.14 LBS | HEART RATE: 78 BPM | BODY MASS INDEX: 22.13 KG/M2

## 2018-11-13 LAB
ALBUMIN SERPL ELPH-MCNC: 3.9 G/DL
ALP BLD-CCNC: 107 U/L
ALT SERPL-CCNC: 30 U/L
ANION GAP SERPL CALC-SCNC: 10 MMOL/L
AST SERPL-CCNC: 20 U/L
BASOPHILS # BLD AUTO: 0 K/UL — SIGNIFICANT CHANGE UP (ref 0–0.2)
BASOPHILS NFR BLD AUTO: 0.6 % — SIGNIFICANT CHANGE UP (ref 0–2)
BILIRUB SERPL-MCNC: 0.4 MG/DL
BUN SERPL-MCNC: 13 MG/DL
CALCIUM SERPL-MCNC: 9 MG/DL
CHLORIDE SERPL-SCNC: 105 MMOL/L
CO2 SERPL-SCNC: 28 MMOL/L
CREAT SERPL-MCNC: 1.11 MG/DL
EOSINOPHIL # BLD AUTO: 0.1 K/UL — SIGNIFICANT CHANGE UP (ref 0–0.5)
EOSINOPHIL NFR BLD AUTO: 2.2 % — SIGNIFICANT CHANGE UP (ref 0–6)
GLUCOSE SERPL-MCNC: 116 MG/DL
HCT VFR BLD CALC: 39.7 % — SIGNIFICANT CHANGE UP (ref 39–50)
HGB BLD-MCNC: 13.3 G/DL — SIGNIFICANT CHANGE UP (ref 13–17)
LYMPHOCYTES # BLD AUTO: 1.2 K/UL — SIGNIFICANT CHANGE UP (ref 1–3.3)
LYMPHOCYTES # BLD AUTO: 26 % — SIGNIFICANT CHANGE UP (ref 13–44)
MAGNESIUM SERPL-MCNC: 2.1 MG/DL
MCHC RBC-ENTMCNC: 31.9 PG — SIGNIFICANT CHANGE UP (ref 27–34)
MCHC RBC-ENTMCNC: 33.4 G/DL — SIGNIFICANT CHANGE UP (ref 32–36)
MCV RBC AUTO: 95.6 FL — SIGNIFICANT CHANGE UP (ref 80–100)
MONOCYTES # BLD AUTO: 0.5 K/UL — SIGNIFICANT CHANGE UP (ref 0–0.9)
MONOCYTES NFR BLD AUTO: 11.1 % — SIGNIFICANT CHANGE UP (ref 2–14)
NEUTROPHILS # BLD AUTO: 2.9 K/UL — SIGNIFICANT CHANGE UP (ref 1.8–7.4)
NEUTROPHILS NFR BLD AUTO: 60.1 % — SIGNIFICANT CHANGE UP (ref 43–77)
PLATELET # BLD AUTO: 155 K/UL — SIGNIFICANT CHANGE UP (ref 150–400)
POTASSIUM SERPL-SCNC: 5 MMOL/L
PROT SERPL-MCNC: 6.6 G/DL
RBC # BLD: 4.16 M/UL — LOW (ref 4.2–5.8)
RBC # FLD: 13.1 % — SIGNIFICANT CHANGE UP (ref 10.3–14.5)
SODIUM SERPL-SCNC: 143 MMOL/L
WBC # BLD: 4.8 K/UL — SIGNIFICANT CHANGE UP (ref 3.8–10.5)
WBC # FLD AUTO: 4.8 K/UL — SIGNIFICANT CHANGE UP (ref 3.8–10.5)

## 2018-11-13 PROCEDURE — 99214 OFFICE O/P EST MOD 30 MIN: CPT

## 2018-11-20 ENCOUNTER — APPOINTMENT (OUTPATIENT)
Dept: DERMATOLOGY | Facility: CLINIC | Age: 83
End: 2018-11-20
Payer: MEDICARE

## 2018-11-20 VITALS — DIASTOLIC BLOOD PRESSURE: 64 MMHG | SYSTOLIC BLOOD PRESSURE: 110 MMHG

## 2018-11-20 DIAGNOSIS — D09.9 CARCINOMA IN SITU, UNSPECIFIED: ICD-10-CM

## 2018-11-20 DIAGNOSIS — L57.0 ACTINIC KERATOSIS: ICD-10-CM

## 2018-11-20 PROCEDURE — 99213 OFFICE O/P EST LOW 20 MIN: CPT | Mod: 25

## 2018-11-20 PROCEDURE — 17003 DESTRUCT PREMALG LES 2-14: CPT

## 2018-11-20 PROCEDURE — 17000 DESTRUCT PREMALG LESION: CPT

## 2018-12-03 ENCOUNTER — APPOINTMENT (OUTPATIENT)
Dept: DERMATOLOGY | Facility: CLINIC | Age: 83
End: 2018-12-03

## 2018-12-07 NOTE — RESULTS/DATA
[FreeTextEntry1] : -CT neck 7/6/18: Spiculated left upper lobe mass with likely mediastinal involvement may impinge upon a course of the recurrent laryngeal nerve. Likely C7 and possibly C3 metastatic lesions for which extension into the spinal canal cannot be excluded.\par \par -CT chest 7/11/18: Left apical lung mass measuring 4.2 cm with metastatic left hilar and mediastinal lymphadenopathy and liver and bony metastases. Left adrenal centimeter nodule. There is a subcentimeter satellite nodule the left upper lobe. There are additional subcentimeter bilateral pulmonary nodules.\par \par -PET CT 7/25/18: Left upper lobe 5 x 3 cm FDG avid mass suspicious for primary lung cancer. Widespread bony metastatic disease. Bulky hypermetabolic FDG avid lymph nodes in the thorax. At least for hypermetabolic metastases to the liver.\par \par -MRI brain 8/14/18: Multiple supra-and infratentorial enhancing lesions compatible for metastatic disease.\par \par -Echocardiogram 8/21/18: Normal LV size and systolic function. Moderately sclerotic valve with slightly elevated velocities do not meet criteria for clinically significant aortic stenosis. Mild aortic, mitral and tricuspid regurgitation.\par \par -PET/CT 10/15/18:  Abnormal FDG-PET/CT scan. \par 1. Compared to PET/CT dated 7/25/2018, there is interval decrease in size and metabolism of a left upper lobe lung mass and FDG-avid prevascular/AP window conglomerate lymphadenopathy, compatible with a partial response to interval therapy. \par 2. Near total resolution of multiple FDG-avid hepatic metastases. \par 3. Several FDG-avid osseous metastases, markedly decreased in number and metabolism. \par 4. New, mildly FDG-avid bilateral lower cervical, mediastinal and hilar lymph nodes are nonspecific. Cervical lymph nodes may be further evaluated with ultrasound-guided percutaneous needle biopsy. Resolution of previously seen hypermetabolic right external iliac lymph node. \par 5. Findings compatible with left vocal cord paralysis, unchanged. Please correlate clinically and with direct visualization, as indicated. \par 6. No new lesion. \par \par MRI Brain 10/26/18: one small residual nodular enhancing focus along the peripheral right frontal cortex consistent with brain metastases. In comparison to the prior \par exam of 8/14/2018 the multiple enhancing supra and infratentorial nodules have resolved consistent with response to treatment.

## 2018-12-07 NOTE — ASSESSMENT
[FreeTextEntry1] : Metastatic NSCLC with adenocarcinoma histology that contains an activating EGFR mutation. Began first line osimertinib on 8/21/18. Achieved AR.\par -Continue Osimertinib for as long as it benefits the patient\par -Will monitor the non-specific LN's in lower cervical, mediastinal/hilar regions on subsequent imaging \par -If diarrhea recurs, patient prefers Pepto Bismol prn; can consider Lomotil as patient reports Imodium caused too much constipation\par -Echocardiogram for late December: requisition given\par -Brain lesions with nice response to osimertinib. No RT required at this time. Continue f/u with Dr. Brian\par -I have recommended treatment with denosumab to reduce the risk of the skeletal related events. He reportedly received clearance from his dentist but may be pursuing dentures.  Will await until all dental procedures completed and healing has taken place before starting denosumab.  He will need to be on calcium + Vit D.\par -Vocal cord paralysis: consultation with  Dr. Dunn for evaluation for vocal cord injection.\par -Labs today\par -F/U in 1 month or sooner should problems arise; will repeat labs at that time. \par -Check out form given to patient with instructions for making appointments

## 2018-12-07 NOTE — PHYSICAL EXAM
[Ambulatory and capable of all self care but unable to carry out any work activities] : Status 2- Ambulatory and capable of all self care but unable to carry out any work activities. Up and about more than 50% of waking hours [Normal] : affect appropriate [de-identified] : No icterus  [de-identified] : MMM O/P clear  [de-identified] : Supple, No LAD  [de-identified] : Somewhat distant SAM B/L  [de-identified] : S1 S2 RRR  [de-identified] : No edema  [de-identified] : Soft NT/ND [de-identified] : No Spine/CVA tenderness

## 2018-12-07 NOTE — REASON FOR VISIT
[Spouse] : spouse [Family Member] : family member [Follow-Up Visit] : a follow-up [FreeTextEntry2] : Lung Cancer

## 2018-12-07 NOTE — HISTORY OF PRESENT ILLNESS
[Disease: _____________________] : Disease: [unfilled] [AJCC Stage: ____] : AJCC Stage: [unfilled] [de-identified] : The patient was in his usual state of health until roughly 3 months ago when he began to experience hoarseness that was gradually worsening. 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 He was sent for a CT scan of his neck that revealed a left upper lobe lung mass. A subsequent CT scan of his chest confirmed this lung mass with likely lymph node involvement and bony and liver metastases. He was sent for a PET/CT scan to complete his staging workup. A CT-guided biopsy of a right pelvic bone on 8/3/18 was positive for adenocarcinoma consistent with lung primary. His tumor tested positive for an activating EGFR mutation. MRI of his brain revealed metastatic disease.  Started first-line Osimertinib on 8/21/18.   [de-identified] : -Right iliac crest bone lesion the CT-guided core biopsy 8/3/18: Metastatic adenocarcinoma consistent with lung primary. IHC is positive for TTF-1 and Napsin-A.  Positive for EGFR L858R mutation in exon 21 and TP53.   [de-identified] : The patient began first line osimertinib on 8/21/18. Diarrhea has not recurred since last visit.  Continues osimertinib. No rash. Had upper denture fitted since last visit. He reports that he plans to have a bridge fitted for the lower jaw. He was given dental clearance however patient will not receive denosumab until after jaw manipulation for this procedure is completed. He states that he has been eating much better since obtaining the denture. His weight is stable.\par \par MRI Brain 10/26/18 with response  to treatment. No RT needed as per Dr. Brian.\par \par Has consultation scheduled with Dr. Dunn for possible vocal cord injection.\par

## 2018-12-13 ENCOUNTER — OUTPATIENT (OUTPATIENT)
Dept: OUTPATIENT SERVICES | Facility: HOSPITAL | Age: 83
LOS: 1 days | Discharge: HOME | End: 2018-12-13

## 2018-12-13 DIAGNOSIS — C34.90 MALIGNANT NEOPLASM OF UNSPECIFIED PART OF UNSPECIFIED BRONCHUS OR LUNG: ICD-10-CM

## 2018-12-18 ENCOUNTER — RESULT REVIEW (OUTPATIENT)
Age: 83
End: 2018-12-18

## 2018-12-18 ENCOUNTER — APPOINTMENT (OUTPATIENT)
Dept: HEMATOLOGY ONCOLOGY | Facility: CLINIC | Age: 83
End: 2018-12-18
Payer: MEDICARE

## 2018-12-18 VITALS
BODY MASS INDEX: 22.87 KG/M2 | HEART RATE: 82 BPM | WEIGHT: 168.65 LBS | TEMPERATURE: 98.2 F | DIASTOLIC BLOOD PRESSURE: 71 MMHG | OXYGEN SATURATION: 99 % | RESPIRATION RATE: 17 BRPM | SYSTOLIC BLOOD PRESSURE: 127 MMHG

## 2018-12-18 LAB
BASOPHILS # BLD AUTO: 0.1 K/UL — SIGNIFICANT CHANGE UP (ref 0–0.2)
BASOPHILS NFR BLD AUTO: 1 % — SIGNIFICANT CHANGE UP (ref 0–2)
EOSINOPHIL # BLD AUTO: 0.1 K/UL — SIGNIFICANT CHANGE UP (ref 0–0.5)
EOSINOPHIL NFR BLD AUTO: 1.9 % — SIGNIFICANT CHANGE UP (ref 0–6)
HCT VFR BLD CALC: 41.1 % — SIGNIFICANT CHANGE UP (ref 39–50)
HGB BLD-MCNC: 14.2 G/DL — SIGNIFICANT CHANGE UP (ref 13–17)
LYMPHOCYTES # BLD AUTO: 1.3 K/UL — SIGNIFICANT CHANGE UP (ref 1–3.3)
LYMPHOCYTES # BLD AUTO: 23 % — SIGNIFICANT CHANGE UP (ref 13–44)
MCHC RBC-ENTMCNC: 32.9 PG — SIGNIFICANT CHANGE UP (ref 27–34)
MCHC RBC-ENTMCNC: 34.5 G/DL — SIGNIFICANT CHANGE UP (ref 32–36)
MCV RBC AUTO: 95.1 FL — SIGNIFICANT CHANGE UP (ref 80–100)
MONOCYTES # BLD AUTO: 0.7 K/UL — SIGNIFICANT CHANGE UP (ref 0–0.9)
MONOCYTES NFR BLD AUTO: 12.8 % — SIGNIFICANT CHANGE UP (ref 2–14)
NEUTROPHILS # BLD AUTO: 3.5 K/UL — SIGNIFICANT CHANGE UP (ref 1.8–7.4)
NEUTROPHILS NFR BLD AUTO: 61.2 % — SIGNIFICANT CHANGE UP (ref 43–77)
PLATELET # BLD AUTO: 155 K/UL — SIGNIFICANT CHANGE UP (ref 150–400)
RBC # BLD: 4.32 M/UL — SIGNIFICANT CHANGE UP (ref 4.2–5.8)
RBC # FLD: 12.9 % — SIGNIFICANT CHANGE UP (ref 10.3–14.5)
WBC # BLD: 5.8 K/UL — SIGNIFICANT CHANGE UP (ref 3.8–10.5)
WBC # FLD AUTO: 5.8 K/UL — SIGNIFICANT CHANGE UP (ref 3.8–10.5)

## 2018-12-18 PROCEDURE — 99214 OFFICE O/P EST MOD 30 MIN: CPT

## 2018-12-18 RX ORDER — CLINDAMYCIN 1 G/10ML
1 GEL TOPICAL TWICE DAILY
Qty: 1 | Refills: 0 | Status: ACTIVE | COMMUNITY
Start: 2018-12-18 | End: 1900-01-01

## 2018-12-24 LAB
ALBUMIN SERPL ELPH-MCNC: 3.9 G/DL
ALP BLD-CCNC: 95 U/L
ALT SERPL-CCNC: 36 U/L
ANION GAP SERPL CALC-SCNC: 11 MMOL/L
AST SERPL-CCNC: 26 U/L
BILIRUB SERPL-MCNC: 0.5 MG/DL
BUN SERPL-MCNC: 13 MG/DL
CALCIUM SERPL-MCNC: 8.8 MG/DL
CEA SERPL-MCNC: 7 NG/ML
CHLORIDE SERPL-SCNC: 107 MMOL/L
CO2 SERPL-SCNC: 27 MMOL/L
CREAT SERPL-MCNC: 1.06 MG/DL
GLUCOSE SERPL-MCNC: 88 MG/DL
MAGNESIUM SERPL-MCNC: 2 MG/DL
POTASSIUM SERPL-SCNC: 4.4 MMOL/L
PROT SERPL-MCNC: 6.7 G/DL
SODIUM SERPL-SCNC: 145 MMOL/L

## 2019-01-18 ENCOUNTER — OUTPATIENT (OUTPATIENT)
Dept: OUTPATIENT SERVICES | Facility: HOSPITAL | Age: 84
LOS: 1 days | Discharge: HOME | End: 2019-01-18

## 2019-01-18 DIAGNOSIS — C34.90 MALIGNANT NEOPLASM OF UNSPECIFIED PART OF UNSPECIFIED BRONCHUS OR LUNG: ICD-10-CM

## 2019-01-22 ENCOUNTER — OUTPATIENT (OUTPATIENT)
Dept: OUTPATIENT SERVICES | Facility: HOSPITAL | Age: 84
LOS: 1 days | End: 2019-01-22
Payer: MEDICARE

## 2019-01-22 ENCOUNTER — APPOINTMENT (OUTPATIENT)
Dept: NUCLEAR MEDICINE | Facility: IMAGING CENTER | Age: 84
End: 2019-01-22
Payer: MEDICARE

## 2019-01-22 DIAGNOSIS — C34.12 MALIGNANT NEOPLASM OF UPPER LOBE, LEFT BRONCHUS OR LUNG: ICD-10-CM

## 2019-01-22 PROCEDURE — 78815 PET IMAGE W/CT SKULL-THIGH: CPT

## 2019-01-22 PROCEDURE — 78815 PET IMAGE W/CT SKULL-THIGH: CPT | Mod: 26,PS

## 2019-01-22 PROCEDURE — A9552: CPT

## 2019-01-25 ENCOUNTER — RESULT REVIEW (OUTPATIENT)
Age: 84
End: 2019-01-25

## 2019-01-25 ENCOUNTER — APPOINTMENT (OUTPATIENT)
Dept: HEMATOLOGY ONCOLOGY | Facility: CLINIC | Age: 84
End: 2019-01-25
Payer: MEDICARE

## 2019-01-25 VITALS
HEART RATE: 75 BPM | RESPIRATION RATE: 16 BRPM | SYSTOLIC BLOOD PRESSURE: 125 MMHG | DIASTOLIC BLOOD PRESSURE: 72 MMHG | WEIGHT: 163.14 LBS | BODY MASS INDEX: 22.13 KG/M2 | OXYGEN SATURATION: 99 % | TEMPERATURE: 98.1 F

## 2019-01-25 LAB
BASOPHILS # BLD AUTO: 0 K/UL — SIGNIFICANT CHANGE UP (ref 0–0.2)
BASOPHILS NFR BLD AUTO: 0.8 % — SIGNIFICANT CHANGE UP (ref 0–2)
EOSINOPHIL # BLD AUTO: 0.1 K/UL — SIGNIFICANT CHANGE UP (ref 0–0.5)
EOSINOPHIL NFR BLD AUTO: 1.9 % — SIGNIFICANT CHANGE UP (ref 0–6)
HCT VFR BLD CALC: 42.1 % — SIGNIFICANT CHANGE UP (ref 39–50)
HGB BLD-MCNC: 14.3 G/DL — SIGNIFICANT CHANGE UP (ref 13–17)
LYMPHOCYTES # BLD AUTO: 1.5 K/UL — SIGNIFICANT CHANGE UP (ref 1–3.3)
LYMPHOCYTES # BLD AUTO: 29.7 % — SIGNIFICANT CHANGE UP (ref 13–44)
MCHC RBC-ENTMCNC: 32.2 PG — SIGNIFICANT CHANGE UP (ref 27–34)
MCHC RBC-ENTMCNC: 33.9 G/DL — SIGNIFICANT CHANGE UP (ref 32–36)
MCV RBC AUTO: 94.9 FL — SIGNIFICANT CHANGE UP (ref 80–100)
MONOCYTES # BLD AUTO: 0.7 K/UL — SIGNIFICANT CHANGE UP (ref 0–0.9)
MONOCYTES NFR BLD AUTO: 13.3 % — SIGNIFICANT CHANGE UP (ref 2–14)
NEUTROPHILS # BLD AUTO: 2.8 K/UL — SIGNIFICANT CHANGE UP (ref 1.8–7.4)
NEUTROPHILS NFR BLD AUTO: 54.3 % — SIGNIFICANT CHANGE UP (ref 43–77)
PLATELET # BLD AUTO: 157 K/UL — SIGNIFICANT CHANGE UP (ref 150–400)
RBC # BLD: 4.43 M/UL — SIGNIFICANT CHANGE UP (ref 4.2–5.8)
RBC # FLD: 12.4 % — SIGNIFICANT CHANGE UP (ref 10.3–14.5)
WBC # BLD: 5.1 K/UL — SIGNIFICANT CHANGE UP (ref 3.8–10.5)
WBC # FLD AUTO: 5.1 K/UL — SIGNIFICANT CHANGE UP (ref 3.8–10.5)

## 2019-01-25 PROCEDURE — 99214 OFFICE O/P EST MOD 30 MIN: CPT

## 2019-01-28 LAB
ALBUMIN SERPL ELPH-MCNC: 4.5 G/DL
ALP BLD-CCNC: 90 U/L
ALT SERPL-CCNC: 28 U/L
ANION GAP SERPL CALC-SCNC: 10 MMOL/L
AST SERPL-CCNC: 21 U/L
BILIRUB SERPL-MCNC: 0.4 MG/DL
BUN SERPL-MCNC: 21 MG/DL
CALCIUM SERPL-MCNC: 9.4 MG/DL
CEA SERPL-MCNC: 6.1 NG/ML
CHLORIDE SERPL-SCNC: 107 MMOL/L
CO2 SERPL-SCNC: 27 MMOL/L
CREAT SERPL-MCNC: 1.28 MG/DL
GLUCOSE SERPL-MCNC: 91 MG/DL
MAGNESIUM SERPL-MCNC: 2.1 MG/DL
POTASSIUM SERPL-SCNC: 4.8 MMOL/L
PROT SERPL-MCNC: 6.9 G/DL
SODIUM SERPL-SCNC: 144 MMOL/L

## 2019-01-31 ENCOUNTER — APPOINTMENT (OUTPATIENT)
Dept: OTOLARYNGOLOGY | Facility: CLINIC | Age: 84
End: 2019-01-31
Payer: MEDICARE

## 2019-01-31 VITALS — DIASTOLIC BLOOD PRESSURE: 77 MMHG | SYSTOLIC BLOOD PRESSURE: 125 MMHG | HEART RATE: 77 BPM

## 2019-01-31 DIAGNOSIS — R49.0 DYSPHONIA: ICD-10-CM

## 2019-01-31 PROCEDURE — 31579 LARYNGOSCOPY TELESCOPIC: CPT

## 2019-01-31 PROCEDURE — 99204 OFFICE O/P NEW MOD 45 MIN: CPT | Mod: 25

## 2019-01-31 NOTE — PHYSICAL EXAM
[Midline] : trachea located in midline position [Normal] : no rashes [de-identified] : severe CI AS

## 2019-01-31 NOTE — CONSULT LETTER
[Dear  ___] : Dear  [unfilled], [Consult Letter:] : I had the pleasure of evaluating your patient, [unfilled]. [Please see my note below.] : Please see my note below. [Consult Closing:] : Thank you very much for allowing me to participate in the care of this patient.  If you have any questions, please do not hesitate to contact me. [Sincerely,] : Sincerely, [FreeTextEntry3] : Walt Dunn MD, PhD\par Chief, Division of Laryngology\par Department of Otolaryngology\par [facility]\par Pediatric Otolaryngology, Health system\par  of Otolaryngology\par [facility] [employer]\par \par \par

## 2019-01-31 NOTE — HISTORY OF PRESENT ILLNESS
[de-identified] : 89yo M who started to have dysphonia in May. PCP concerned for LPR, but didn't resolve, saw OSH ENT who saw a polyp? and paralyzed VC. Granuloma resolved 1 month with PPI. Dysphonia persisted and unclear etiology of VC paralysis so he was sent for a neck CT scan, which subsequently found that he had a left upper lung mass. F/u CT's confirmed it was a LN in lung with mets to bones and liver. PET CT was done for staging and a CT guided bx of pelvic bone was consistent with adenocarcinoma with lung primary. Then MRI of the brain revealed mets disease to brain. Started chemo immediately after seeing heme onc here, which was 08/2018. Currently still on chemo. Had recent PET-CT, which showed marked improvement. Still shows VC paralysis on PET-CT. No SOB, breathing well. Denies dysphagia. Has not had any choking. Tolerates liquids. Has not lost voice completely where he cant speak at all. \par Did have few bronchitis episodes but no hospitalizations or PNA\par No aspiration symptoms, no h/o heartburn, no dysphagia, no hemoptysis, rhinorrhea, occasional epistaxis\par No globus sensation\par Only comfortable whispering\par   \par

## 2019-01-31 NOTE — REVIEW OF SYSTEMS
[Patient Intake Form Reviewed] : Patient intake form was reviewed [As Noted in HPI] : as noted in HPI [Hoarseness] : hoarseness [Negative] : Heme/Lymph

## 2019-02-01 ENCOUNTER — OUTPATIENT (OUTPATIENT)
Dept: OUTPATIENT SERVICES | Facility: HOSPITAL | Age: 84
LOS: 1 days | Discharge: HOME | End: 2019-02-01

## 2019-02-01 ENCOUNTER — APPOINTMENT (OUTPATIENT)
Dept: SPEECH THERAPY | Facility: CLINIC | Age: 84
End: 2019-02-01

## 2019-02-06 DIAGNOSIS — R13.13 DYSPHAGIA, PHARYNGEAL PHASE: ICD-10-CM

## 2019-02-13 ENCOUNTER — APPOINTMENT (OUTPATIENT)
Dept: OTOLARYNGOLOGY | Facility: CLINIC | Age: 84
End: 2019-02-13
Payer: MEDICARE

## 2019-02-13 DIAGNOSIS — J38.3 OTHER DISEASES OF VOCAL CORDS: ICD-10-CM

## 2019-02-13 PROCEDURE — 99214 OFFICE O/P EST MOD 30 MIN: CPT | Mod: 25

## 2019-02-13 PROCEDURE — 31574Z: CUSTOM

## 2019-02-13 NOTE — HISTORY OF PRESENT ILLNESS
[de-identified] : 89yo M here for f/u dysphonia. Interested in pursuing injection laryngoplasty of vocal cords for left paralysis. Has MRI pending now. PET-CT had good results. Still SOB with speaking. Cont coughing. No new issues. Treatment continues. No hemoptysis.\par

## 2019-02-13 NOTE — PHYSICAL EXAM
[Midline] : trachea located in midline position [Laryngoscopy Performed] : laryngoscopy was performed, see procedure section for findings [Normal] : no rashes [de-identified] : severely breathy voice

## 2019-02-13 NOTE — CONSULT LETTER
[Please see my note below.] : Please see my note below. [Consult Closing:] : Thank you very much for allowing me to participate in the care of this patient.  If you have any questions, please do not hesitate to contact me. [Sincerely,] : Sincerely, [Dear  ___] : Dear  [unfilled], [Consult Letter:] : I had the pleasure of evaluating your patient, [unfilled]. [FreeTextEntry3] : Walt Dunn MD, PhD\par Chief, Division of Laryngology\par Department of Otolaryngology\par [facility]\par Pediatric Otolaryngology, [facility]\par  of Otolaryngology\par [facility] [facility] [employer]\par \par \par

## 2019-02-18 ENCOUNTER — FORM ENCOUNTER (OUTPATIENT)
Age: 84
End: 2019-02-18

## 2019-02-19 ENCOUNTER — RX RENEWAL (OUTPATIENT)
Age: 84
End: 2019-02-19

## 2019-02-19 ENCOUNTER — APPOINTMENT (OUTPATIENT)
Age: 84
End: 2019-02-19
Payer: MEDICARE

## 2019-02-19 ENCOUNTER — OUTPATIENT (OUTPATIENT)
Dept: OUTPATIENT SERVICES | Facility: HOSPITAL | Age: 84
LOS: 1 days | End: 2019-02-19
Payer: MEDICARE

## 2019-02-19 DIAGNOSIS — C79.31 SECONDARY MALIGNANT NEOPLASM OF BRAIN: ICD-10-CM

## 2019-02-19 PROCEDURE — 70553 MRI BRAIN STEM W/O & W/DYE: CPT

## 2019-02-19 PROCEDURE — 70553 MRI BRAIN STEM W/O & W/DYE: CPT | Mod: 26

## 2019-02-27 ENCOUNTER — OUTPATIENT (OUTPATIENT)
Dept: OUTPATIENT SERVICES | Facility: HOSPITAL | Age: 84
LOS: 1 days | Discharge: HOME | End: 2019-02-27

## 2019-02-27 DIAGNOSIS — C34.90 MALIGNANT NEOPLASM OF UNSPECIFIED PART OF UNSPECIFIED BRONCHUS OR LUNG: ICD-10-CM

## 2019-02-28 ENCOUNTER — APPOINTMENT (OUTPATIENT)
Dept: RADIATION ONCOLOGY | Facility: CLINIC | Age: 84
End: 2019-02-28
Payer: MEDICARE

## 2019-02-28 VITALS
HEART RATE: 74 BPM | WEIGHT: 163 LBS | DIASTOLIC BLOOD PRESSURE: 70 MMHG | OXYGEN SATURATION: 97 % | RESPIRATION RATE: 16 BRPM | BODY MASS INDEX: 22.11 KG/M2 | SYSTOLIC BLOOD PRESSURE: 138 MMHG

## 2019-02-28 PROCEDURE — 99213 OFFICE O/P EST LOW 20 MIN: CPT

## 2019-03-04 NOTE — REASON FOR VISIT
[Brain Metastasis] : brain metastasis [Lung Cancer] : lung cancer [Family Member] : family member [Routine Follow-Up] : routine follow-up visit for

## 2019-03-04 NOTE — HISTORY OF PRESENT ILLNESS
[FreeTextEntry1] : Mr. Durga Sebastian presents today for follow up. \par \par ONCOLOGY HISTORY\par 8/28/2018-Mr. 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 He presents today for consideration for radiation therapy. 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 CT of the neck on 7/6/18 showed a WILFREDO spiculated mass, 3.9 C 3.4 cm with extension into the mediastinum, likely impinging on the course of the left recurrent laryngeal nerve.  There was a lytic lesion at C7 with bulging of the posterior margin of the vertebral body suspicious for metastatic disease, extension into the spinal cord cannot be completely excluded. Question of a C3 lesion. Biopsy of a right iliac crest bone lesion on 8/3/18 showed metastatic adenocarcinoma consistent with lung primary. L58R mutation detected. EGFR mutation and TR53 mutation detected. PET scan 7/25/18 showed widespread tumor burden of PET avid bony metastasis. There was a 5 X 3 X 3 cm PET avid mass within the WILFREDO suspicious for a primary lung carcinoma. \par There were bulky hypermetabolic PET avid lymph nodes identified within the preaortic outline and the AP window suspicious for metabolic lymphadenopathy. There were at least 4 hypermetabolic mets identified within the liver. \par \par He underwent brain MRI on 8/14/18 which showed multiple supra and infratentorial enhancing lesions compatible with metastatic disease. At least 15 supratentorial lesions, some with areas of central necrosis. Largest in the right posterior frontal lobe measuring 1.1 cm in greatest diameter. Many with mild surrounding vasogenci edema. Infratentorially there were at least 6 enhancing lesions involving the cerebellar hemispheres with mild surrounding vasogenic edema. \par \par At the time of initial consult he had not neurologic symptoms, and had recently started tagrisso. \par I planned to evaluate after initial treatment with tagrisso, which has good CNS penetration. \par He was advised to follow up with dermatology regarding his scalp lesions\par \par 11/6/18- Mr. Sebastian presents today for follow up, Since he saw us last he had a brief pause in osimertinib due to diarrhea. MRI 10/26/18 showed one small residual nodular enhancing focus along the peripheral right frontal cortex consistent with brain mets. In comparison to prior exam 8/14/18 the multiple enhancing supra and infratentorial nodules have resolved. \par \par PET 10/15/18 showed there was interval decrease in size and metabolism of a left upper lobe lung mass and FDG-avid prevascular/AP window conglomerate lymphadenopathy, compatible with a partial response to interval therapy. \par  Near total resolution of multiple FDG-avid hepatic metastases. \par Several FDG-avid osseous metastases, markedly decreased in number and \par metabolism. \par New, mildly FDG-avid bilateral lower cervical, mediastinal and hilar \par lymph nodes are nonspecific. Cervical lymph nodes may be further evaluated \par with ultrasound-guided percutaneous needle biopsy. Resolution of previously \par seen hypermetabolic right external iliac lymph node. 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 He denies numbness or tingling. Notes swallowing has much improved. He has difficulty with balance, which he has had for around 4-5 years. This is is stable. \par Overall feeling much better from prior. \par \par 2/28/19- Mr. Sebastian presents today for follow up. He has been maintained on Osimertinib. MRI brain 2/19/19 showed minimal residual growth of frontal and right cerebellar lesions barely perceptible on this exam. No new pathology. Interval stability compared with 10/26/18.\par \par PET scan 1/2019 showed  Partially FDG-avid left upper lobe lung mass is unchanged in size and demonstrates slightly lessextensive FDG avidity which is unchanged in intensity as compared to prior study dated 10/15/2018. \par 2. Resolution of FDG avidity associated with prevascular/AP window lymphadenopathy. Resolution of nonspecific FDG-avid mediastinal and bilateral hilar lymph nodes and hypermetabolism associated with small bilateral cervical lymph nodes. \par 3. Resolution of FDG-avid hepatic metastases. \par 4. Partially treated osseous metastases, decreased in metabolism. \par 5. Left vocal cord paralysis, unchanged. \par 6. No new lesion. \par \par Today he is feeling well. Denies headaches, trouble with vision. Swallowing and speech have improved after recent vocal cord injections. Denies numbness/tingling. Continues to ambulate with walker. No focal weakness.

## 2019-03-04 NOTE — PHYSICAL EXAM
[No Focal Deficits] : no focal deficits [Normal] : no focal deficits [de-identified] : hoarseness [de-identified] : pain in the neck with neck flexion [de-identified] : ambulating with rolling walker [de-identified] : a 2 cm flesh colored, irregular, verrucous lesion on the scalp near prior excision site [de-identified] : cranieal nerves 2-12 grossly intact. finger-nose testing slightly shakier on left side. peripheral visual fields intact

## 2019-03-04 NOTE — LETTER CLOSING
[Sincerely yours,] : Sincerely yours, [FreeTextEntry3] : Nathaniel Brian MD\par Attending Physician\par Department of Radiation Medicine\par \par \par

## 2019-03-04 NOTE — REVIEW OF SYSTEMS
[Hoarseness] : hoarseness [Cough] : cough [Disturbance Of Gait] : gait disturbance [Difficulty Walking] : difficulty walking [Negative] : Respiratory [Loss of Hearing] : no loss of hearing [Shortness Of Breath] : no shortness of breath [Abdominal Pain] : no abdominal pain [Vomiting] : no vomiting [Constipation] : no constipation [Diarrhea] : no diarrhea [Muscle Weakness] : no muscle weakness [Confused] : no confusion [Dizziness] : no dizziness [Fainting] : no fainting [Easy Bleeding] : no tendency for easy bleeding [Easy Bruising] : no tendency for easy bruising [FreeTextEntry4] : swallowing and voice are improved [FreeTextEntry9] : weakness to bilateral legs, stable, using walker for past year [de-identified] : prior skin squamous cell on the scalp s/p excision  [de-identified] : balance still bad, has been going on for 5 years

## 2019-03-08 ENCOUNTER — RESULT REVIEW (OUTPATIENT)
Age: 84
End: 2019-03-08

## 2019-03-08 ENCOUNTER — APPOINTMENT (OUTPATIENT)
Dept: HEMATOLOGY ONCOLOGY | Facility: CLINIC | Age: 84
End: 2019-03-08
Payer: MEDICARE

## 2019-03-08 VITALS
DIASTOLIC BLOOD PRESSURE: 72 MMHG | HEART RATE: 67 BPM | SYSTOLIC BLOOD PRESSURE: 124 MMHG | OXYGEN SATURATION: 98 % | TEMPERATURE: 98 F | BODY MASS INDEX: 22.28 KG/M2 | RESPIRATION RATE: 16 BRPM | WEIGHT: 164.24 LBS

## 2019-03-08 LAB
ALBUMIN SERPL ELPH-MCNC: 4.3 G/DL
ALP BLD-CCNC: 90 U/L
ALT SERPL-CCNC: 30 U/L
ANION GAP SERPL CALC-SCNC: 11 MMOL/L
AST SERPL-CCNC: 21 U/L
BASOPHILS # BLD AUTO: 0 K/UL — SIGNIFICANT CHANGE UP (ref 0–0.2)
BASOPHILS NFR BLD AUTO: 0.7 % — SIGNIFICANT CHANGE UP (ref 0–2)
BILIRUB SERPL-MCNC: 0.6 MG/DL
BUN SERPL-MCNC: 16 MG/DL
CALCIUM SERPL-MCNC: 9.4 MG/DL
CHLORIDE SERPL-SCNC: 104 MMOL/L
CO2 SERPL-SCNC: 28 MMOL/L
CREAT SERPL-MCNC: 1.23 MG/DL
EOSINOPHIL # BLD AUTO: 0.1 K/UL — SIGNIFICANT CHANGE UP (ref 0–0.5)
EOSINOPHIL NFR BLD AUTO: 2.6 % — SIGNIFICANT CHANGE UP (ref 0–6)
GLUCOSE SERPL-MCNC: 91 MG/DL
HCT VFR BLD CALC: 41.6 % — SIGNIFICANT CHANGE UP (ref 39–50)
HGB BLD-MCNC: 13.9 G/DL — SIGNIFICANT CHANGE UP (ref 13–17)
LYMPHOCYTES # BLD AUTO: 1.5 K/UL — SIGNIFICANT CHANGE UP (ref 1–3.3)
LYMPHOCYTES # BLD AUTO: 32 % — SIGNIFICANT CHANGE UP (ref 13–44)
MCHC RBC-ENTMCNC: 31.9 PG — SIGNIFICANT CHANGE UP (ref 27–34)
MCHC RBC-ENTMCNC: 33.4 G/DL — SIGNIFICANT CHANGE UP (ref 32–36)
MCV RBC AUTO: 95.4 FL — SIGNIFICANT CHANGE UP (ref 80–100)
MONOCYTES # BLD AUTO: 0.6 K/UL — SIGNIFICANT CHANGE UP (ref 0–0.9)
MONOCYTES NFR BLD AUTO: 13.2 % — SIGNIFICANT CHANGE UP (ref 2–14)
NEUTROPHILS # BLD AUTO: 2.4 K/UL — SIGNIFICANT CHANGE UP (ref 1.8–7.4)
NEUTROPHILS NFR BLD AUTO: 51.5 % — SIGNIFICANT CHANGE UP (ref 43–77)
PLATELET # BLD AUTO: 164 K/UL — SIGNIFICANT CHANGE UP (ref 150–400)
POTASSIUM SERPL-SCNC: 4.3 MMOL/L
PROT SERPL-MCNC: 6.8 G/DL
RBC # BLD: 4.36 M/UL — SIGNIFICANT CHANGE UP (ref 4.2–5.8)
RBC # FLD: 12.4 % — SIGNIFICANT CHANGE UP (ref 10.3–14.5)
SODIUM SERPL-SCNC: 143 MMOL/L
WBC # BLD: 4.6 K/UL — SIGNIFICANT CHANGE UP (ref 3.8–10.5)
WBC # FLD AUTO: 4.6 K/UL — SIGNIFICANT CHANGE UP (ref 3.8–10.5)

## 2019-03-08 PROCEDURE — 99214 OFFICE O/P EST MOD 30 MIN: CPT

## 2019-04-03 ENCOUNTER — APPOINTMENT (OUTPATIENT)
Dept: OTOLARYNGOLOGY | Facility: CLINIC | Age: 84
End: 2019-04-03
Payer: MEDICARE

## 2019-04-03 VITALS
SYSTOLIC BLOOD PRESSURE: 135 MMHG | HEIGHT: 72 IN | BODY MASS INDEX: 23.84 KG/M2 | HEART RATE: 68 BPM | DIASTOLIC BLOOD PRESSURE: 75 MMHG | WEIGHT: 176 LBS

## 2019-04-03 PROCEDURE — 99214 OFFICE O/P EST MOD 30 MIN: CPT | Mod: 25

## 2019-04-03 PROCEDURE — 69210 REMOVE IMPACTED EAR WAX UNI: CPT

## 2019-04-03 PROCEDURE — 31579 LARYNGOSCOPY TELESCOPIC: CPT

## 2019-04-03 RX ORDER — QUINAPRIL HYDROCHLORIDE 20 MG/1
20 TABLET, FILM COATED ORAL
Refills: 0 | Status: DISCONTINUED | COMMUNITY
End: 2019-04-03

## 2019-04-03 RX ORDER — NYSTATIN 100000 [USP'U]/ML
100000 SUSPENSION ORAL 4 TIMES DAILY
Qty: 1 | Refills: 2 | Status: DISCONTINUED | COMMUNITY
Start: 2018-10-12 | End: 2019-04-03

## 2019-04-03 RX ORDER — FINASTERIDE 5 MG/1
5 TABLET, FILM COATED ORAL
Refills: 0 | Status: DISCONTINUED | COMMUNITY
End: 2019-04-03

## 2019-04-03 RX ORDER — TAMSULOSIN HYDROCHLORIDE 0.4 MG/1
0.4 CAPSULE ORAL
Refills: 0 | Status: DISCONTINUED | COMMUNITY
End: 2019-04-03

## 2019-04-03 NOTE — PHYSICAL EXAM
[de-identified] : severe CI AS [Midline] : trachea located in midline position [Laryngoscopy Performed] : laryngoscopy was performed, see procedure section for findings [Normal] : no rashes [de-identified] : excellent voice with support, hyperfunctional at first

## 2019-04-03 NOTE — HISTORY OF PRESENT ILLNESS
[de-identified] : 89 year old male follow up for dysphonia injection laryngoplasty of vocal cords for left paralysis 2/13/19.  History of  metastatic lung CA, oral chemotherapy continued. Patient states he is feeling well, recently had a cold which left voice raspy and caused him to eventually lose voice.  Patient reports since then, voice has improved, initially very soft and at a whisper, but speaks much more clearly now.  Patient denies dysphagia, odynophagia, pain in throat, dyspnea, hemoptysis and fevers. \par

## 2019-04-03 NOTE — REASON FOR VISIT
[Subsequent Evaluation] : a subsequent evaluation for [Family Member] : family member [FreeTextEntry2] : follow up for dysphonia injection laryngoplasty of vocal cords for left paralysis 2/13/19

## 2019-04-03 NOTE — CONSULT LETTER
[Dear  ___] : Dear  [unfilled], [Consult Letter:] : I had the pleasure of evaluating your patient, [unfilled]. [Please see my note below.] : Please see my note below. [Consult Closing:] : Thank you very much for allowing me to participate in the care of this patient.  If you have any questions, please do not hesitate to contact me. [Sincerely,] : Sincerely, [FreeTextEntry3] : Walt Dunn MD, PhD\par Chief, Division of Laryngology\par Department of Otolaryngology\par [facility]\par Pediatric Otolaryngology, [facility]\par  of Otolaryngology\par [facility] [employer]\par \par \par

## 2019-04-08 ENCOUNTER — APPOINTMENT (OUTPATIENT)
Dept: SPEECH THERAPY | Facility: CLINIC | Age: 84
End: 2019-04-08

## 2019-04-11 DIAGNOSIS — R49.0 DYSPHONIA: ICD-10-CM

## 2019-04-16 ENCOUNTER — FORM ENCOUNTER (OUTPATIENT)
Age: 84
End: 2019-04-16

## 2019-04-16 ENCOUNTER — OUTPATIENT (OUTPATIENT)
Dept: OUTPATIENT SERVICES | Facility: HOSPITAL | Age: 84
LOS: 1 days | Discharge: HOME | End: 2019-04-16

## 2019-04-16 DIAGNOSIS — C34.90 MALIGNANT NEOPLASM OF UNSPECIFIED PART OF UNSPECIFIED BRONCHUS OR LUNG: ICD-10-CM

## 2019-04-17 ENCOUNTER — OUTPATIENT (OUTPATIENT)
Dept: OUTPATIENT SERVICES | Facility: HOSPITAL | Age: 84
LOS: 1 days | End: 2019-04-17
Payer: MEDICARE

## 2019-04-17 ENCOUNTER — APPOINTMENT (OUTPATIENT)
Age: 84
End: 2019-04-17
Payer: MEDICARE

## 2019-04-17 DIAGNOSIS — C34.12 MALIGNANT NEOPLASM OF UPPER LOBE, LEFT BRONCHUS OR LUNG: ICD-10-CM

## 2019-04-17 PROCEDURE — 78815 PET IMAGE W/CT SKULL-THIGH: CPT | Mod: 26,PS

## 2019-04-17 PROCEDURE — A9552: CPT

## 2019-04-17 PROCEDURE — 78815 PET IMAGE W/CT SKULL-THIGH: CPT

## 2019-04-19 ENCOUNTER — RESULT REVIEW (OUTPATIENT)
Age: 84
End: 2019-04-19

## 2019-04-19 ENCOUNTER — APPOINTMENT (OUTPATIENT)
Dept: HEMATOLOGY ONCOLOGY | Facility: CLINIC | Age: 84
End: 2019-04-19
Payer: MEDICARE

## 2019-04-19 VITALS
SYSTOLIC BLOOD PRESSURE: 125 MMHG | HEART RATE: 77 BPM | OXYGEN SATURATION: 100 % | RESPIRATION RATE: 14 BRPM | TEMPERATURE: 98.1 F | BODY MASS INDEX: 22.07 KG/M2 | WEIGHT: 162.7 LBS | DIASTOLIC BLOOD PRESSURE: 67 MMHG

## 2019-04-19 LAB
BASOPHILS # BLD AUTO: 0 K/UL — SIGNIFICANT CHANGE UP (ref 0–0.2)
BASOPHILS NFR BLD AUTO: 0.7 % — SIGNIFICANT CHANGE UP (ref 0–2)
CEA SERPL-MCNC: 7.1 NG/ML
EOSINOPHIL # BLD AUTO: 0.2 K/UL — SIGNIFICANT CHANGE UP (ref 0–0.5)
EOSINOPHIL NFR BLD AUTO: 3.6 % — SIGNIFICANT CHANGE UP (ref 0–6)
HCT VFR BLD CALC: 39.7 % — SIGNIFICANT CHANGE UP (ref 39–50)
HGB BLD-MCNC: 13.9 G/DL — SIGNIFICANT CHANGE UP (ref 13–17)
LYMPHOCYTES # BLD AUTO: 1.5 K/UL — SIGNIFICANT CHANGE UP (ref 1–3.3)
LYMPHOCYTES # BLD AUTO: 32.3 % — SIGNIFICANT CHANGE UP (ref 13–44)
MCHC RBC-ENTMCNC: 33.6 PG — SIGNIFICANT CHANGE UP (ref 27–34)
MCHC RBC-ENTMCNC: 35.1 G/DL — SIGNIFICANT CHANGE UP (ref 32–36)
MCV RBC AUTO: 95.9 FL — SIGNIFICANT CHANGE UP (ref 80–100)
MONOCYTES # BLD AUTO: 0.7 K/UL — SIGNIFICANT CHANGE UP (ref 0–0.9)
MONOCYTES NFR BLD AUTO: 14.3 % — HIGH (ref 2–14)
NEUTROPHILS # BLD AUTO: 2.3 K/UL — SIGNIFICANT CHANGE UP (ref 1.8–7.4)
NEUTROPHILS NFR BLD AUTO: 49.1 % — SIGNIFICANT CHANGE UP (ref 43–77)
PLATELET # BLD AUTO: 148 K/UL — LOW (ref 150–400)
RBC # BLD: 4.14 M/UL — LOW (ref 4.2–5.8)
RBC # FLD: 12.9 % — SIGNIFICANT CHANGE UP (ref 10.3–14.5)
WBC # BLD: 4.6 K/UL — SIGNIFICANT CHANGE UP (ref 3.8–10.5)
WBC # FLD AUTO: 4.6 K/UL — SIGNIFICANT CHANGE UP (ref 3.8–10.5)

## 2019-04-19 PROCEDURE — 99214 OFFICE O/P EST MOD 30 MIN: CPT

## 2019-04-22 ENCOUNTER — APPOINTMENT (OUTPATIENT)
Dept: SPEECH THERAPY | Facility: CLINIC | Age: 84
End: 2019-04-22

## 2019-04-22 LAB
ALBUMIN SERPL ELPH-MCNC: 4.2 G/DL
ALP BLD-CCNC: 83 U/L
ALT SERPL-CCNC: 21 U/L
ANION GAP SERPL CALC-SCNC: 13 MMOL/L
AST SERPL-CCNC: 20 U/L
BILIRUB SERPL-MCNC: 0.3 MG/DL
BUN SERPL-MCNC: 18 MG/DL
CALCIUM SERPL-MCNC: 9.2 MG/DL
CHLORIDE SERPL-SCNC: 109 MMOL/L
CO2 SERPL-SCNC: 25 MMOL/L
CREAT SERPL-MCNC: 1.2 MG/DL
GLUCOSE SERPL-MCNC: 92 MG/DL
MAGNESIUM SERPL-MCNC: 2 MG/DL
POTASSIUM SERPL-SCNC: 4.5 MMOL/L
PROT SERPL-MCNC: 6.6 G/DL
SODIUM SERPL-SCNC: 147 MMOL/L

## 2019-05-02 ENCOUNTER — APPOINTMENT (OUTPATIENT)
Dept: SPEECH THERAPY | Facility: CLINIC | Age: 84
End: 2019-05-02

## 2019-05-09 ENCOUNTER — APPOINTMENT (OUTPATIENT)
Dept: SPEECH THERAPY | Facility: CLINIC | Age: 84
End: 2019-05-09

## 2019-05-16 ENCOUNTER — APPOINTMENT (OUTPATIENT)
Dept: SPEECH THERAPY | Facility: CLINIC | Age: 84
End: 2019-05-16

## 2019-05-28 ENCOUNTER — OUTPATIENT (OUTPATIENT)
Dept: OUTPATIENT SERVICES | Facility: HOSPITAL | Age: 84
LOS: 1 days | Discharge: HOME | End: 2019-05-28

## 2019-05-28 DIAGNOSIS — C34.90 MALIGNANT NEOPLASM OF UNSPECIFIED PART OF UNSPECIFIED BRONCHUS OR LUNG: ICD-10-CM

## 2019-05-28 NOTE — REASON FOR VISIT
[Routine Follow-Up] : routine follow-up visit for [Brain Metastasis] : brain metastasis [Lung Cancer] : lung cancer [Family Member] : family member

## 2019-05-28 NOTE — VITALS
[Maximal Pain Intensity: 0/10] : 0/10 [NoTreatment Scheduled] : no treatment scheduled [Least Pain Intensity: 0/10] : 0/10 [80: Normal activity with effort; some signs or symptoms of disease.] : 80: Normal activity with effort; some signs or symptoms of disease.  [ECOG Performance Status: 2 - Ambulatory and capable of all self care but unable to carry out any work activities] : Performance Status: 2 - Ambulatory and capable of all self care but unable to carry out any work activities. Up and about more than 50% of waking hours

## 2019-05-29 ENCOUNTER — FORM ENCOUNTER (OUTPATIENT)
Age: 84
End: 2019-05-29

## 2019-05-30 ENCOUNTER — OUTPATIENT (OUTPATIENT)
Dept: OUTPATIENT SERVICES | Facility: HOSPITAL | Age: 84
LOS: 1 days | End: 2019-05-30
Payer: MEDICARE

## 2019-05-30 ENCOUNTER — APPOINTMENT (OUTPATIENT)
Dept: RADIATION ONCOLOGY | Facility: CLINIC | Age: 84
End: 2019-05-30
Payer: MEDICARE

## 2019-05-30 ENCOUNTER — APPOINTMENT (OUTPATIENT)
Dept: MRI IMAGING | Facility: CLINIC | Age: 84
End: 2019-05-30
Payer: MEDICARE

## 2019-05-30 VITALS
BODY MASS INDEX: 21.92 KG/M2 | HEART RATE: 61 BPM | RESPIRATION RATE: 14 BRPM | TEMPERATURE: 98 F | DIASTOLIC BLOOD PRESSURE: 60 MMHG | WEIGHT: 161.6 LBS | OXYGEN SATURATION: 100 % | SYSTOLIC BLOOD PRESSURE: 117 MMHG

## 2019-05-30 DIAGNOSIS — C34.12 MALIGNANT NEOPLASM OF UPPER LOBE, LEFT BRONCHUS OR LUNG: ICD-10-CM

## 2019-05-30 PROCEDURE — A9585: CPT

## 2019-05-30 PROCEDURE — 70553 MRI BRAIN STEM W/O & W/DYE: CPT | Mod: 26

## 2019-05-30 PROCEDURE — 99213 OFFICE O/P EST LOW 20 MIN: CPT

## 2019-05-30 PROCEDURE — 70553 MRI BRAIN STEM W/O & W/DYE: CPT

## 2019-05-30 NOTE — HISTORY OF PRESENT ILLNESS
[FreeTextEntry1] : Mr. Durga Sebastian presents today for follow up. \par \par ONCOLOGY HISTORY\par 8/28/2018-Mr. 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 He presented initially 8/23/18 for consideration for radiation therapy. 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 CT of the neck on 7/6/18 showed a WILFREDO spiculated mass, 3.9 C 3.4 cm with extension into the mediastinum, likely impinging on the course of the left recurrent laryngeal nerve.  There was a lytic lesion at C7 with bulging of the posterior margin of the vertebral body suspicious for metastatic disease, extension into the spinal cord cannot be completely excluded. Question of a C3 lesion. Biopsy of a right iliac crest bone lesion on 8/3/18 showed metastatic adenocarcinoma consistent with lung primary. L58R mutation detected. EGFR mutation and TR53 mutation detected. PET scan 7/25/18 showed widespread tumor burden of PET avid bony metastasis. There was a 5 X 3 X 3 cm PET avid mass within the WILFREDO suspicious for a primary lung carcinoma. \par There were bulky hypermetabolic PET avid lymph nodes identified within the preaortic outline and the AP window suspicious for metabolic lymphadenopathy. There were at least 4 hypermetabolic mets identified within the liver. \par \par He underwent brain MRI on 8/14/18 which showed multiple supra and infratentorial enhancing lesions compatible with metastatic disease. At least 15 supratentorial lesions, some with areas of central necrosis. Largest in the right posterior frontal lobe measuring 1.1 cm in greatest diameter. Many with mild surrounding vasogenci edema. Infratentorially there were at least 6 enhancing lesions involving the cerebellar hemispheres with mild surrounding vasogenic edema. \par \par At the time of initial consult he had not neurologic symptoms, and had recently started tagrisso. \par I planned to evaluate after initial treatment with tagrisso, which has good CNS penetration. \par He was advised to follow up with dermatology regarding his scalp lesions\par \par 11/6/18- Mr. Sebastian presents today for follow up, Since he saw us last he had a brief pause in osimertinib due to diarrhea. MRI 10/26/18 showed one small residual nodular enhancing focus along the peripheral right frontal cortex consistent with brain mets. In comparison to prior exam 8/14/18 the multiple enhancing supra and infratentorial nodules have resolved. \par \par PET 10/15/18 showed there was interval decrease in size and metabolism of a left upper lobe lung mass and FDG-avid prevascular/AP window conglomerate lymphadenopathy, compatible with a partial response to interval therapy. \par  Near total resolution of multiple FDG-avid hepatic metastases. \par Several FDG-avid osseous metastases, markedly decreased in number and \par metabolism. \par New, mildly FDG-avid bilateral lower cervical, mediastinal and hilar \par lymph nodes are nonspecific. Cervical lymph nodes may be further evaluated \par with ultrasound-guided percutaneous needle biopsy. Resolution of previously \par seen hypermetabolic right external iliac lymph node. 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 He denies numbness or tingling. Notes swallowing has much improved. He has difficulty with balance, which he has had for around 4-5 years. This is is stable. \par Overall feeling much better from prior. \par \par 2/28/19- Mr. Sebastian presents today for follow up. He has been maintained on Osimertinib. MRI brain 2/19/19 showed minimal residual growth of frontal and right cerebellar lesions barely perceptible on this exam. No new pathology. Interval stability compared with 10/26/18.\par \par PET scan 1/2019 showed  Partially FDG-avid left upper lobe lung mass is unchanged in size and demonstrates slightly lessextensive FDG avidity which is unchanged in intensity as compared to prior study dated 10/15/2018. \par 2. Resolution of FDG avidity associated with prevascular/AP window lymphadenopathy. Resolution of nonspecific FDG-avid mediastinal and bilateral hilar lymph nodes and hypermetabolism associated with small bilateral cervical lymph nodes. \par 3. Resolution of FDG-avid hepatic metastases. \par 4. Partially treated osseous metastases, decreased in metabolism. \par 5. Left vocal cord paralysis, unchanged. \par 6. No new lesion. \par \par Today he is feeling well. Denies headaches, trouble with vision. Swallowing and speech have improved after recent vocal cord injections. Denies numbness/tingling. Continues to ambulate with walker. No focal weakness. \par \par 5/30/19- Mr. Sebastian presents today for follow up. MRI brain was done this morning. Has continued to follow with speech pathology, however he does not do recommended exercises. Has continued to follow with medical oncology. Continues on osimertinib.\par PET scan 4/17/19 showed a Partially FDG-avid left upper lobe lung mass is minimally increased in size and significantly increased in metabolism as compared to FDG PET/CT dated 1/22/2019, compatible with FDG-avid disease. \par New, mild FDG activity in AP window, is concerning for recurrent disease. Partially treated osseous metastases are unchanged on CT, and decreased \par in metabolism. \par Status post interval procedure for left vocal cord paralysis, has noticed slight improvement in voice quality.\par Today he is feeling well. Denies headaches, focal weakness, numbness, tingling, confusion. Memory remains impaired bust stable. \par

## 2019-05-30 NOTE — PHYSICAL EXAM
[No Focal Deficits] : no focal deficits [Normal] : oriented to person, place and time, the affect was normal, the mood was normal and not anxious [de-identified] : hoarseness [de-identified] : pain in the neck with neck flexion [de-identified] : ambulating with rolling walker [de-identified] : a 2 cm flesh colored, irregular, verrucous lesion on the scalp near prior excision site [de-identified] : cranieal nerves 2-12 grossly intact. finger-nose testing slightly shakier on left side. peripheral visual fields intact

## 2019-05-30 NOTE — REVIEW OF SYSTEMS
[Hoarseness] : hoarseness [Cough] : cough [Disturbance Of Gait] : gait disturbance [Difficulty Walking] : difficulty walking [Negative] : Endocrine [Recent Change In Weight] : ~T recent weight change [Loss of Hearing] : no loss of hearing [Shortness Of Breath] : no shortness of breath [Abdominal Pain] : no abdominal pain [Vomiting] : no vomiting [Constipation] : no constipation [Diarrhea] : no diarrhea [Confused] : no confusion [Dizziness] : no dizziness [Fainting] : no fainting [Muscle Weakness] : no muscle weakness [Easy Bleeding] : no tendency for easy bleeding [Easy Bruising] : no tendency for easy bruising [FreeTextEntry2] : lost 15 lb since 4/2019 [FreeTextEntry4] : swallowing and voice are improved [FreeTextEntry9] : weakness to bilateral legs, stable, using walker for past year [de-identified] : prior skin squamous cell on the scalp s/p excision  [de-identified] : balance still bad, has been going on for 5 years, stable

## 2019-05-31 ENCOUNTER — RESULT REVIEW (OUTPATIENT)
Age: 84
End: 2019-05-31

## 2019-05-31 ENCOUNTER — APPOINTMENT (OUTPATIENT)
Dept: HEMATOLOGY ONCOLOGY | Facility: CLINIC | Age: 84
End: 2019-05-31
Payer: MEDICARE

## 2019-05-31 VITALS
BODY MASS INDEX: 21.83 KG/M2 | DIASTOLIC BLOOD PRESSURE: 70 MMHG | TEMPERATURE: 98 F | SYSTOLIC BLOOD PRESSURE: 122 MMHG | RESPIRATION RATE: 16 BRPM | OXYGEN SATURATION: 99 % | WEIGHT: 160.94 LBS | HEART RATE: 74 BPM

## 2019-05-31 DIAGNOSIS — J38.01 PARALYSIS OF VOCAL CORDS AND LARYNX, UNILATERAL: ICD-10-CM

## 2019-05-31 DIAGNOSIS — R91.8 OTHER NONSPECIFIC ABNORMAL FINDING OF LUNG FIELD: ICD-10-CM

## 2019-05-31 LAB
BASOPHILS # BLD AUTO: 0.1 K/UL — SIGNIFICANT CHANGE UP (ref 0–0.2)
BASOPHILS NFR BLD AUTO: 1.1 % — SIGNIFICANT CHANGE UP (ref 0–2)
EOSINOPHIL # BLD AUTO: 0.1 K/UL — SIGNIFICANT CHANGE UP (ref 0–0.5)
EOSINOPHIL NFR BLD AUTO: 2.4 % — SIGNIFICANT CHANGE UP (ref 0–6)
HCT VFR BLD CALC: 40.8 % — SIGNIFICANT CHANGE UP (ref 39–50)
HGB BLD-MCNC: 14.2 G/DL — SIGNIFICANT CHANGE UP (ref 13–17)
LYMPHOCYTES # BLD AUTO: 1.7 K/UL — SIGNIFICANT CHANGE UP (ref 1–3.3)
LYMPHOCYTES # BLD AUTO: 36.5 % — SIGNIFICANT CHANGE UP (ref 13–44)
MCHC RBC-ENTMCNC: 33.2 PG — SIGNIFICANT CHANGE UP (ref 27–34)
MCHC RBC-ENTMCNC: 34.9 G/DL — SIGNIFICANT CHANGE UP (ref 32–36)
MCV RBC AUTO: 94.9 FL — SIGNIFICANT CHANGE UP (ref 80–100)
MONOCYTES # BLD AUTO: 0.6 K/UL — SIGNIFICANT CHANGE UP (ref 0–0.9)
MONOCYTES NFR BLD AUTO: 12.1 % — SIGNIFICANT CHANGE UP (ref 2–14)
NEUTROPHILS # BLD AUTO: 2.3 K/UL — SIGNIFICANT CHANGE UP (ref 1.8–7.4)
NEUTROPHILS NFR BLD AUTO: 47.8 % — SIGNIFICANT CHANGE UP (ref 43–77)
PLATELET # BLD AUTO: 162 K/UL — SIGNIFICANT CHANGE UP (ref 150–400)
RBC # BLD: 4.3 M/UL — SIGNIFICANT CHANGE UP (ref 4.2–5.8)
RBC # FLD: 12.3 % — SIGNIFICANT CHANGE UP (ref 10.3–14.5)
WBC # BLD: 4.8 K/UL — SIGNIFICANT CHANGE UP (ref 3.8–10.5)
WBC # FLD AUTO: 4.8 K/UL — SIGNIFICANT CHANGE UP (ref 3.8–10.5)

## 2019-05-31 PROCEDURE — 99214 OFFICE O/P EST MOD 30 MIN: CPT

## 2019-06-03 LAB
ALBUMIN SERPL ELPH-MCNC: 4.3 G/DL
ALP BLD-CCNC: 85 U/L
ALT SERPL-CCNC: 34 U/L
ANION GAP SERPL CALC-SCNC: 11 MMOL/L
AST SERPL-CCNC: 25 U/L
BILIRUB SERPL-MCNC: 0.5 MG/DL
BUN SERPL-MCNC: 16 MG/DL
CALCIUM SERPL-MCNC: 9.3 MG/DL
CEA SERPL-MCNC: 8.8 NG/ML
CHLORIDE SERPL-SCNC: 108 MMOL/L
CO2 SERPL-SCNC: 25 MMOL/L
CREAT SERPL-MCNC: 1.28 MG/DL
GLUCOSE SERPL-MCNC: 92 MG/DL
MAGNESIUM SERPL-MCNC: 2 MG/DL
POTASSIUM SERPL-SCNC: 4.6 MMOL/L
PROT SERPL-MCNC: 6.4 G/DL
SODIUM SERPL-SCNC: 144 MMOL/L

## 2019-06-07 ENCOUNTER — MESSAGE (OUTPATIENT)
Age: 84
End: 2019-06-07

## 2019-06-07 ENCOUNTER — APPOINTMENT (OUTPATIENT)
Dept: HEMATOLOGY ONCOLOGY | Facility: CLINIC | Age: 84
End: 2019-06-07

## 2019-07-09 ENCOUNTER — APPOINTMENT (OUTPATIENT)
Dept: NUCLEAR MEDICINE | Facility: IMAGING CENTER | Age: 84
End: 2019-07-09
Payer: MEDICARE

## 2019-07-09 ENCOUNTER — OUTPATIENT (OUTPATIENT)
Dept: OUTPATIENT SERVICES | Facility: HOSPITAL | Age: 84
LOS: 1 days | End: 2019-07-09
Payer: MEDICARE

## 2019-07-09 DIAGNOSIS — C34.12 MALIGNANT NEOPLASM OF UPPER LOBE, LEFT BRONCHUS OR LUNG: ICD-10-CM

## 2019-07-09 PROCEDURE — A9552: CPT

## 2019-07-09 PROCEDURE — 78815 PET IMAGE W/CT SKULL-THIGH: CPT | Mod: 26,PS

## 2019-07-09 PROCEDURE — 78815 PET IMAGE W/CT SKULL-THIGH: CPT

## 2019-07-10 ENCOUNTER — OUTPATIENT (OUTPATIENT)
Dept: OUTPATIENT SERVICES | Facility: HOSPITAL | Age: 84
LOS: 1 days | Discharge: HOME | End: 2019-07-10

## 2019-07-10 DIAGNOSIS — C34.90 MALIGNANT NEOPLASM OF UNSPECIFIED PART OF UNSPECIFIED BRONCHUS OR LUNG: ICD-10-CM

## 2019-07-12 ENCOUNTER — RESULT REVIEW (OUTPATIENT)
Age: 84
End: 2019-07-12

## 2019-07-12 ENCOUNTER — APPOINTMENT (OUTPATIENT)
Dept: HEMATOLOGY ONCOLOGY | Facility: CLINIC | Age: 84
End: 2019-07-12
Payer: MEDICARE

## 2019-07-12 VITALS
BODY MASS INDEX: 20.93 KG/M2 | WEIGHT: 154.32 LBS | TEMPERATURE: 97.8 F | SYSTOLIC BLOOD PRESSURE: 120 MMHG | OXYGEN SATURATION: 98 % | DIASTOLIC BLOOD PRESSURE: 70 MMHG | RESPIRATION RATE: 18 BRPM | HEART RATE: 83 BPM

## 2019-07-12 LAB
ALBUMIN SERPL ELPH-MCNC: 4.2 G/DL
ALP BLD-CCNC: 88 U/L
ALT SERPL-CCNC: 44 U/L
ANION GAP SERPL CALC-SCNC: 10 MMOL/L
AST SERPL-CCNC: 27 U/L
BASOPHILS # BLD AUTO: 0 K/UL — SIGNIFICANT CHANGE UP (ref 0–0.2)
BASOPHILS NFR BLD AUTO: 0.4 % — SIGNIFICANT CHANGE UP (ref 0–2)
BILIRUB SERPL-MCNC: 0.5 MG/DL
BUN SERPL-MCNC: 21 MG/DL
CALCIUM SERPL-MCNC: 9.4 MG/DL
CEA SERPL-MCNC: 10.5 NG/ML
CHLORIDE SERPL-SCNC: 108 MMOL/L
CO2 SERPL-SCNC: 27 MMOL/L
CREAT SERPL-MCNC: 1.26 MG/DL
EOSINOPHIL # BLD AUTO: 0.1 K/UL — SIGNIFICANT CHANGE UP (ref 0–0.5)
EOSINOPHIL NFR BLD AUTO: 3 % — SIGNIFICANT CHANGE UP (ref 0–6)
GLUCOSE SERPL-MCNC: 108 MG/DL
HCT VFR BLD CALC: 42 % — SIGNIFICANT CHANGE UP (ref 39–50)
HGB BLD-MCNC: 14.1 G/DL — SIGNIFICANT CHANGE UP (ref 13–17)
LYMPHOCYTES # BLD AUTO: 1.4 K/UL — SIGNIFICANT CHANGE UP (ref 1–3.3)
LYMPHOCYTES # BLD AUTO: 31.3 % — SIGNIFICANT CHANGE UP (ref 13–44)
MAGNESIUM SERPL-MCNC: 2 MG/DL
MCHC RBC-ENTMCNC: 33 PG — SIGNIFICANT CHANGE UP (ref 27–34)
MCHC RBC-ENTMCNC: 33.5 G/DL — SIGNIFICANT CHANGE UP (ref 32–36)
MCV RBC AUTO: 98.5 FL — SIGNIFICANT CHANGE UP (ref 80–100)
MONOCYTES # BLD AUTO: 0.5 K/UL — SIGNIFICANT CHANGE UP (ref 0–0.9)
MONOCYTES NFR BLD AUTO: 11.3 % — SIGNIFICANT CHANGE UP (ref 2–14)
NEUTROPHILS # BLD AUTO: 2.4 K/UL — SIGNIFICANT CHANGE UP (ref 1.8–7.4)
NEUTROPHILS NFR BLD AUTO: 53.9 % — SIGNIFICANT CHANGE UP (ref 43–77)
PLATELET # BLD AUTO: 132 K/UL — LOW (ref 150–400)
POTASSIUM SERPL-SCNC: 4.8 MMOL/L
PROT SERPL-MCNC: 6.2 G/DL
RBC # BLD: 4.27 M/UL — SIGNIFICANT CHANGE UP (ref 4.2–5.8)
RBC # FLD: 12.4 % — SIGNIFICANT CHANGE UP (ref 10.3–14.5)
SODIUM SERPL-SCNC: 145 MMOL/L
WBC # BLD: 4.4 K/UL — SIGNIFICANT CHANGE UP (ref 3.8–10.5)
WBC # FLD AUTO: 4.4 K/UL — SIGNIFICANT CHANGE UP (ref 3.8–10.5)

## 2019-07-12 PROCEDURE — 99214 OFFICE O/P EST MOD 30 MIN: CPT

## 2019-08-21 ENCOUNTER — OUTPATIENT (OUTPATIENT)
Dept: OUTPATIENT SERVICES | Facility: HOSPITAL | Age: 84
LOS: 1 days | Discharge: HOME | End: 2019-08-21

## 2019-08-21 DIAGNOSIS — C34.90 MALIGNANT NEOPLASM OF UNSPECIFIED PART OF UNSPECIFIED BRONCHUS OR LUNG: ICD-10-CM

## 2019-08-23 ENCOUNTER — RESULT REVIEW (OUTPATIENT)
Age: 84
End: 2019-08-23

## 2019-08-23 ENCOUNTER — APPOINTMENT (OUTPATIENT)
Dept: HEMATOLOGY ONCOLOGY | Facility: CLINIC | Age: 84
End: 2019-08-23
Payer: MEDICARE

## 2019-08-23 VITALS
TEMPERATURE: 97.5 F | DIASTOLIC BLOOD PRESSURE: 65 MMHG | HEART RATE: 79 BPM | OXYGEN SATURATION: 99 % | WEIGHT: 154.32 LBS | SYSTOLIC BLOOD PRESSURE: 125 MMHG | RESPIRATION RATE: 18 BRPM | BODY MASS INDEX: 20.93 KG/M2

## 2019-08-23 DIAGNOSIS — C34.90 MALIGNANT NEOPLASM OF UNSPECIFIED PART OF UNSPECIFIED BRONCHUS OR LUNG: ICD-10-CM

## 2019-08-23 DIAGNOSIS — C79.31 MALIGNANT NEOPLASM OF UNSPECIFIED PART OF UNSPECIFIED BRONCHUS OR LUNG: ICD-10-CM

## 2019-08-23 LAB
BASOPHILS # BLD AUTO: 0 K/UL — SIGNIFICANT CHANGE UP (ref 0–0.2)
BASOPHILS NFR BLD AUTO: 0.2 % — SIGNIFICANT CHANGE UP (ref 0–2)
EOSINOPHIL # BLD AUTO: 0.2 K/UL — SIGNIFICANT CHANGE UP (ref 0–0.5)
EOSINOPHIL NFR BLD AUTO: 3.6 % — SIGNIFICANT CHANGE UP (ref 0–6)
HCT VFR BLD CALC: 42.9 % — SIGNIFICANT CHANGE UP (ref 39–50)
HGB BLD-MCNC: 14 G/DL — SIGNIFICANT CHANGE UP (ref 13–17)
LYMPHOCYTES # BLD AUTO: 1.7 K/UL — SIGNIFICANT CHANGE UP (ref 1–3.3)
LYMPHOCYTES # BLD AUTO: 32.1 % — SIGNIFICANT CHANGE UP (ref 13–44)
MCHC RBC-ENTMCNC: 32.6 G/DL — SIGNIFICANT CHANGE UP (ref 32–36)
MCHC RBC-ENTMCNC: 32.6 PG — SIGNIFICANT CHANGE UP (ref 27–34)
MCV RBC AUTO: 100 FL — SIGNIFICANT CHANGE UP (ref 80–100)
MONOCYTES # BLD AUTO: 0.7 K/UL — SIGNIFICANT CHANGE UP (ref 0–0.9)
MONOCYTES NFR BLD AUTO: 13.1 % — SIGNIFICANT CHANGE UP (ref 2–14)
NEUTROPHILS # BLD AUTO: 2.8 K/UL — SIGNIFICANT CHANGE UP (ref 1.8–7.4)
NEUTROPHILS NFR BLD AUTO: 51.1 % — SIGNIFICANT CHANGE UP (ref 43–77)
PLATELET # BLD AUTO: 142 K/UL — LOW (ref 150–400)
RBC # BLD: 4.29 M/UL — SIGNIFICANT CHANGE UP (ref 4.2–5.8)
RBC # FLD: 13.3 % — SIGNIFICANT CHANGE UP (ref 10.3–14.5)
WBC # BLD: 5.4 K/UL — SIGNIFICANT CHANGE UP (ref 3.8–10.5)
WBC # FLD AUTO: 5.4 K/UL — SIGNIFICANT CHANGE UP (ref 3.8–10.5)

## 2019-08-23 PROCEDURE — 99214 OFFICE O/P EST MOD 30 MIN: CPT

## 2019-08-23 RX ORDER — OSIMERTINIB 80 1/1
80 TABLET, FILM COATED ORAL
Qty: 30 | Refills: 5 | Status: ACTIVE | COMMUNITY
Start: 2018-08-16 | End: 1900-01-01

## 2019-08-26 LAB
ALBUMIN SERPL ELPH-MCNC: 4.3 G/DL
ALP BLD-CCNC: 88 U/L
ALT SERPL-CCNC: 41 U/L
ANION GAP SERPL CALC-SCNC: 13 MMOL/L
AST SERPL-CCNC: 25 U/L
BILIRUB SERPL-MCNC: 0.6 MG/DL
BUN SERPL-MCNC: 24 MG/DL
CALCIUM SERPL-MCNC: 9.1 MG/DL
CHLORIDE SERPL-SCNC: 105 MMOL/L
CO2 SERPL-SCNC: 26 MMOL/L
CREAT SERPL-MCNC: 1.3 MG/DL
GLUCOSE SERPL-MCNC: 86 MG/DL
POTASSIUM SERPL-SCNC: 4.8 MMOL/L
PROT SERPL-MCNC: 6.7 G/DL
SODIUM SERPL-SCNC: 144 MMOL/L

## 2019-09-19 ENCOUNTER — APPOINTMENT (OUTPATIENT)
Dept: HEMATOLOGY ONCOLOGY | Facility: CLINIC | Age: 84
End: 2019-09-19
Payer: MEDICARE

## 2019-09-19 DIAGNOSIS — B37.0 CANDIDAL STOMATITIS: ICD-10-CM

## 2019-09-19 DIAGNOSIS — Z71.89 OTHER SPECIFIED COUNSELING: ICD-10-CM

## 2019-09-19 DIAGNOSIS — Z51.5 ENCOUNTER FOR PALLIATIVE CARE: ICD-10-CM

## 2019-09-19 DIAGNOSIS — R63.0 ANOREXIA: ICD-10-CM

## 2019-09-19 PROCEDURE — 99205 OFFICE O/P NEW HI 60 MIN: CPT

## 2019-09-19 RX ORDER — NYSTATIN 100000 [USP'U]/ML
100000 SUSPENSION ORAL 4 TIMES DAILY
Qty: 2 | Refills: 0 | Status: ACTIVE | COMMUNITY
Start: 2019-09-19 | End: 1900-01-01

## 2019-10-01 ENCOUNTER — APPOINTMENT (OUTPATIENT)
Dept: NUCLEAR MEDICINE | Facility: IMAGING CENTER | Age: 84
End: 2019-10-01
Payer: MEDICARE

## 2019-10-01 ENCOUNTER — OUTPATIENT (OUTPATIENT)
Dept: OUTPATIENT SERVICES | Facility: HOSPITAL | Age: 84
LOS: 1 days | End: 2019-10-01
Payer: MEDICARE

## 2019-10-01 DIAGNOSIS — C34.12 MALIGNANT NEOPLASM OF UPPER LOBE, LEFT BRONCHUS OR LUNG: ICD-10-CM

## 2019-10-01 PROCEDURE — 78815 PET IMAGE W/CT SKULL-THIGH: CPT

## 2019-10-01 PROCEDURE — 78815 PET IMAGE W/CT SKULL-THIGH: CPT | Mod: 26,PS

## 2019-10-01 PROCEDURE — A9552: CPT

## 2019-10-02 ENCOUNTER — OUTPATIENT (OUTPATIENT)
Dept: OUTPATIENT SERVICES | Facility: HOSPITAL | Age: 84
LOS: 1 days | Discharge: HOME | End: 2019-10-02

## 2019-10-02 DIAGNOSIS — C34.90 MALIGNANT NEOPLASM OF UNSPECIFIED PART OF UNSPECIFIED BRONCHUS OR LUNG: ICD-10-CM

## 2019-10-04 ENCOUNTER — RESULT REVIEW (OUTPATIENT)
Age: 84
End: 2019-10-04

## 2019-10-04 ENCOUNTER — APPOINTMENT (OUTPATIENT)
Dept: HEMATOLOGY ONCOLOGY | Facility: CLINIC | Age: 84
End: 2019-10-04
Payer: MEDICARE

## 2019-10-04 ENCOUNTER — FORM ENCOUNTER (OUTPATIENT)
Age: 84
End: 2019-10-04

## 2019-10-04 VITALS
SYSTOLIC BLOOD PRESSURE: 123 MMHG | DIASTOLIC BLOOD PRESSURE: 66 MMHG | HEART RATE: 88 BPM | WEIGHT: 152.12 LBS | BODY MASS INDEX: 20.63 KG/M2 | OXYGEN SATURATION: 95 % | RESPIRATION RATE: 16 BRPM | TEMPERATURE: 98.1 F

## 2019-10-04 PROBLEM — Z51.5 ENCOUNTER FOR PALLIATIVE CARE: Status: ACTIVE | Noted: 2019-10-04

## 2019-10-04 PROBLEM — Z71.89 ADVANCE CARE PLANNING: Status: ACTIVE | Noted: 2019-10-04

## 2019-10-04 PROBLEM — R63.0 APPETITE LOSS: Status: ACTIVE | Noted: 2019-10-04

## 2019-10-04 LAB
ALBUMIN SERPL ELPH-MCNC: 4.5 G/DL
ALP BLD-CCNC: 93 U/L
ALT SERPL-CCNC: 32 U/L
ANION GAP SERPL CALC-SCNC: 14 MMOL/L
AST SERPL-CCNC: 25 U/L
BASOPHILS # BLD AUTO: 0 K/UL — SIGNIFICANT CHANGE UP (ref 0–0.2)
BASOPHILS NFR BLD AUTO: 0.5 % — SIGNIFICANT CHANGE UP (ref 0–2)
BILIRUB SERPL-MCNC: 0.5 MG/DL
BUN SERPL-MCNC: 23 MG/DL
CALCIUM SERPL-MCNC: 9.6 MG/DL
CEA SERPL-MCNC: 18.8 NG/ML
CHLORIDE SERPL-SCNC: 105 MMOL/L
CO2 SERPL-SCNC: 27 MMOL/L
CREAT SERPL-MCNC: 1.31 MG/DL
EOSINOPHIL # BLD AUTO: 0.1 K/UL — SIGNIFICANT CHANGE UP (ref 0–0.5)
EOSINOPHIL NFR BLD AUTO: 2.6 % — SIGNIFICANT CHANGE UP (ref 0–6)
GLUCOSE SERPL-MCNC: 120 MG/DL
HCT VFR BLD CALC: 43.5 % — SIGNIFICANT CHANGE UP (ref 39–50)
HGB BLD-MCNC: 14.7 G/DL — SIGNIFICANT CHANGE UP (ref 13–17)
LYMPHOCYTES # BLD AUTO: 1.5 K/UL — SIGNIFICANT CHANGE UP (ref 1–3.3)
LYMPHOCYTES # BLD AUTO: 30.2 % — SIGNIFICANT CHANGE UP (ref 13–44)
MCHC RBC-ENTMCNC: 33.8 G/DL — SIGNIFICANT CHANGE UP (ref 32–36)
MCHC RBC-ENTMCNC: 34 PG — SIGNIFICANT CHANGE UP (ref 27–34)
MCV RBC AUTO: 101 FL — HIGH (ref 80–100)
MONOCYTES # BLD AUTO: 0.6 K/UL — SIGNIFICANT CHANGE UP (ref 0–0.9)
MONOCYTES NFR BLD AUTO: 12.3 % — SIGNIFICANT CHANGE UP (ref 2–14)
NEUTROPHILS # BLD AUTO: 2.7 K/UL — SIGNIFICANT CHANGE UP (ref 1.8–7.4)
NEUTROPHILS NFR BLD AUTO: 54.5 % — SIGNIFICANT CHANGE UP (ref 43–77)
PLATELET # BLD AUTO: 139 K/UL — LOW (ref 150–400)
POTASSIUM SERPL-SCNC: 4.3 MMOL/L
PROT SERPL-MCNC: 6.8 G/DL
RBC # BLD: 4.32 M/UL — SIGNIFICANT CHANGE UP (ref 4.2–5.8)
RBC # FLD: 12.4 % — SIGNIFICANT CHANGE UP (ref 10.3–14.5)
SODIUM SERPL-SCNC: 146 MMOL/L
WBC # BLD: 5 K/UL — SIGNIFICANT CHANGE UP (ref 3.8–10.5)
WBC # FLD AUTO: 5 K/UL — SIGNIFICANT CHANGE UP (ref 3.8–10.5)

## 2019-10-04 PROCEDURE — 99215 OFFICE O/P EST HI 40 MIN: CPT

## 2019-10-04 NOTE — HISTORY OF PRESENT ILLNESS
[FreeTextEntry1] : 89yoM with Stage IV lung adenocarcinoma presents for initial palliative care visit, referred by Dr. Fox. Patient on Tagrisso, tolerating well. \par \par Main issue for which he presents today is loss of appetite. Per patient, he does not enjoy eating. he has lost a net of 20 lbs since time of diagnosis 8/2018.  Over the last three months he lost 4 lbs.  \par \par Wife reports that breakfast is his best meal but then he eats smaller meals for lunch and dinner. \par \par ROS:\par +loss of taste\par Denies mood changes, trouble sleeping, n/v, constipation. \par \par Patient is , lives with his wife. Has supportive family, is independent with ADLs. \par \par I-Stop Ref#: [id number]

## 2019-10-04 NOTE — PHYSICAL EXAM
[Oriented To Time, Place, And Person] : oriented to person, place, and time [Affect] : the affect was normal [General Appearance - Alert] : alert [General Appearance - In No Acute Distress] : in no acute distress [Sclera] : the sclera and conjunctiva were normal [Neck Appearance] : the appearance of the neck was normal [Skin Color & Pigmentation] : normal skin color and pigmentation [No Focal Deficits] : no focal deficits [FreeTextEntry1] : +oral thrush [] : no respiratory distress [Auscultation Breath Sounds / Voice Sounds] : lungs were clear to auscultation bilaterally [Heart Rate And Rhythm] : heart rate was normal and rhythm regular [Heart Sounds] : normal S1 and S2 [Bowel Sounds] : normal bowel sounds [Abdomen Soft] : soft [Abdomen Tenderness] : non-tender

## 2019-10-04 NOTE — ASSESSMENT
[______] : HCP: [unfilled] [FreeTextEntry1] : 89yoM with:\par \par 1. Stage IV Lung Adenocarcinoma - On Tagrisso. Med Onc follow up. \par \par 2. Hypoguesia with associated low appetite - Provided patient information for hypoguesia/dysgeusia.\par Medical cannabis certification completed today. Provided cannabis education, overview of state program, and discussed adverse effects in detail.  Recommend starting with LOW THC:HIGH CBD formulation at low dose of THC (~1mg/dose).\par \par 3. Oral thrush - may be contributing to patient's loss of taste. Course of Nystatin suspension ordered. \par \par 4. Encounter for Palliative Care/Advance Care Planning - HCP form completed in office today. \par MOLST form presented to patient and family; they will take form home to review. \par \par RTO 1 month

## 2019-10-05 ENCOUNTER — OUTPATIENT (OUTPATIENT)
Dept: OUTPATIENT SERVICES | Facility: HOSPITAL | Age: 84
LOS: 1 days | End: 2019-10-05
Payer: MEDICARE

## 2019-10-05 ENCOUNTER — APPOINTMENT (OUTPATIENT)
Dept: MRI IMAGING | Facility: CLINIC | Age: 84
End: 2019-10-05
Payer: MEDICARE

## 2019-10-05 DIAGNOSIS — Z00.8 ENCOUNTER FOR OTHER GENERAL EXAMINATION: ICD-10-CM

## 2019-10-05 PROCEDURE — 72156 MRI NECK SPINE W/O & W/DYE: CPT

## 2019-10-05 PROCEDURE — 72156 MRI NECK SPINE W/O & W/DYE: CPT | Mod: 26

## 2019-10-05 PROCEDURE — A9585: CPT

## 2019-10-15 NOTE — VITALS
[Least Pain Intensity: 0/10] : 0/10 [Maximal Pain Intensity: 0/10] : 0/10 [NoTreatment Scheduled] : no treatment scheduled [80: Normal activity with effort; some signs or symptoms of disease.] : 80: Normal activity with effort; some signs or symptoms of disease.  [ECOG Performance Status: 2 - Ambulatory and capable of all self care but unable to carry out any work activities] : Performance Status: 2 - Ambulatory and capable of all self care but unable to carry out any work activities. Up and about more than 50% of waking hours

## 2019-10-15 NOTE — REASON FOR VISIT
[Routine Follow-Up] : routine follow-up visit for [Family Member] : family member [Lung Cancer] : lung cancer [Brain Metastasis] : brain metastasis

## 2019-10-16 ENCOUNTER — FORM ENCOUNTER (OUTPATIENT)
Age: 84
End: 2019-10-16

## 2019-10-17 ENCOUNTER — APPOINTMENT (OUTPATIENT)
Dept: MRI IMAGING | Facility: IMAGING CENTER | Age: 84
End: 2019-10-17
Payer: MEDICARE

## 2019-10-17 ENCOUNTER — OUTPATIENT (OUTPATIENT)
Dept: OUTPATIENT SERVICES | Facility: HOSPITAL | Age: 84
LOS: 1 days | Discharge: HOME | End: 2019-10-17
Payer: MEDICARE

## 2019-10-17 ENCOUNTER — OUTPATIENT (OUTPATIENT)
Dept: OUTPATIENT SERVICES | Facility: HOSPITAL | Age: 84
LOS: 1 days | End: 2019-10-17
Payer: MEDICARE

## 2019-10-17 ENCOUNTER — APPOINTMENT (OUTPATIENT)
Dept: RADIATION ONCOLOGY | Facility: CLINIC | Age: 84
End: 2019-10-17
Payer: MEDICARE

## 2019-10-17 VITALS
HEIGHT: 72 IN | SYSTOLIC BLOOD PRESSURE: 122 MMHG | DIASTOLIC BLOOD PRESSURE: 54 MMHG | WEIGHT: 151 LBS | RESPIRATION RATE: 17 BRPM | TEMPERATURE: 97.3 F | BODY MASS INDEX: 20.45 KG/M2 | OXYGEN SATURATION: 96 % | HEART RATE: 79 BPM

## 2019-10-17 DIAGNOSIS — C79.51 SECONDARY MALIGNANT NEOPLASM OF BONE: ICD-10-CM

## 2019-10-17 DIAGNOSIS — C34.90 MALIGNANT NEOPLASM OF UNSPECIFIED PART OF UNSPECIFIED BRONCHUS OR LUNG: ICD-10-CM

## 2019-10-17 DIAGNOSIS — C79.31 SECONDARY MALIGNANT NEOPLASM OF BRAIN: ICD-10-CM

## 2019-10-17 PROCEDURE — 70553 MRI BRAIN STEM W/O & W/DYE: CPT | Mod: 26

## 2019-10-17 PROCEDURE — 70553 MRI BRAIN STEM W/O & W/DYE: CPT

## 2019-10-17 PROCEDURE — 99214 OFFICE O/P EST MOD 30 MIN: CPT | Mod: 25

## 2019-10-17 PROCEDURE — 77262 THER RADIOLOGY TX PLNG INTRM: CPT

## 2019-10-17 PROCEDURE — A9585: CPT

## 2019-10-17 NOTE — HISTORY OF PRESENT ILLNESS
[FreeTextEntry1] : Mr. Durga Sebastian presents today for follow up. I have been following him with serial imaging since initial diagnosis of brain metastasis in 8/2018.  He has not received brain radiation.  \par \par ONCOLOGY HISTORY\par 8/28/2018-Mr. 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 He presented initially 8/23/18 for consideration for radiation therapy. 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 CT of the neck on 7/6/18 showed a WILFREDO spiculated mass, 3.9 C 3.4 cm with extension into the mediastinum, likely impinging on the course of the left recurrent laryngeal nerve.  There was a lytic lesion at C7 with bulging of the posterior margin of the vertebral body suspicious for metastatic disease, extension into the spinal cord cannot be completely excluded. Question of a C3 lesion. Biopsy of a right iliac crest bone lesion on 8/3/18 showed metastatic adenocarcinoma consistent with lung primary. L58R mutation detected. EGFR mutation and TR53 mutation detected. PET scan 7/25/18 showed widespread tumor burden of PET avid bony metastasis. There was a 5 X 3 X 3 cm PET avid mass within the WILFREDO suspicious for a primary lung carcinoma. \par There were bulky hypermetabolic PET avid lymph nodes identified within the preaortic outline and the AP window suspicious for metabolic lymphadenopathy. There were at least 4 hypermetabolic mets identified within the liver. \par \par He underwent brain MRI on 8/14/18 which showed multiple supra and infratentorial enhancing lesions compatible with metastatic disease. At least 15 supratentorial lesions, some with areas of central necrosis. Largest in the right posterior frontal lobe measuring 1.1 cm in greatest diameter. Many with mild surrounding vasogenci edema. Infratentorially there were at least 6 enhancing lesions involving the cerebellar hemispheres with mild surrounding vasogenic edema. \par \par At the time of initial consult he had not neurologic symptoms, and had recently started tagrisso. \par I planned to evaluate after initial treatment with tagrisso, which has good CNS penetration. \par No complaints of any headaches.  Pt still is slightly unsteady when attempts to walk without assistance so pt ambulates with a walker.\par He was advised to follow up with dermatology regarding his scalp lesions\par \par 11/6/18- Mr. Sebastian presents today for follow up, Since he saw us last he had a brief pause in osimertinib due to diarrhea. MRI 10/26/18 showed one small residual nodular enhancing focus along the peripheral right frontal cortex consistent with brain mets. In comparison to prior exam 8/14/18 the multiple enhancing supra and infratentorial nodules have resolved. \par \par PET 10/15/18 showed there was interval decrease in size and metabolism of a left upper lobe lung mass and FDG-avid prevascular/AP window conglomerate lymphadenopathy, compatible with a partial response to interval therapy. \par  Near total resolution of multiple FDG-avid hepatic metastases. \par Several FDG-avid osseous metastases, markedly decreased in number and \par metabolism. \par New, mildly FDG-avid bilateral lower cervical, mediastinal and hilar \par lymph nodes are nonspecific. Cervical lymph nodes may be further evaluated \par with ultrasound-guided percutaneous needle biopsy. Resolution of previously \par seen hypermetabolic right external iliac lymph node. 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 He denies numbness or tingling. Notes swallowing has much improved. He has difficulty with balance, which he has had for around 4-5 years. This is is stable. \par Overall feeling much better from prior. \par \par 2/28/19- Mr. Sebastian presents today for follow up. He has been maintained on Osimertinib. MRI brain 2/19/19 showed minimal residual growth of frontal and right cerebellar lesions barely perceptible on this exam. No new pathology. Interval stability compared with 10/26/18.\par \par PET scan 1/2019 showed  Partially FDG-avid left upper lobe lung mass is unchanged in size and demonstrates slightly lessextensive FDG avidity which is unchanged in intensity as compared to prior study dated 10/15/2018. \par 2. Resolution of FDG avidity associated with prevascular/AP window lymphadenopathy. Resolution of nonspecific FDG-avid mediastinal and bilateral hilar lymph nodes and hypermetabolism associated with small bilateral cervical lymph nodes. \par 3. Resolution of FDG-avid hepatic metastases. \par 4. Partially treated osseous metastases, decreased in metabolism. \par 5. Left vocal cord paralysis, unchanged. \par 6. No new lesion. \par \par Today he is feeling well. Denies headaches, trouble with vision. Swallowing and speech have improved after recent vocal cord injections. Denies numbness/tingling. Continues to ambulate with walker. No focal weakness. \par \par 5/30/19- Mr. Sebastian presents today for follow up. MRI brain was done this morning. Has continued to follow with speech pathology, however he does not do recommended exercises. Has continued to follow with medical oncology. Continues on osimertinib.\par PET scan 4/17/19 showed a Partially FDG-avid left upper lobe lung mass is minimally increased in size and significantly increased in metabolism as compared to FDG PET/CT dated 1/22/2019, compatible with FDG-avid disease. \par New, mild FDG activity in AP window, is concerning for recurrent disease. Partially treated osseous metastases are unchanged on CT, and decreased in metabolism. Status post interval procedure for left vocal cord paralysis, has noticed slight improvement in voice quality.\par Today he is feeling well. Denies headaches, focal weakness, numbness, tingling, confusion. Memory remains impaired bust stable.\par \par 10/17/19- Mr. Sebastian presents today for follow up.  \par PET scan 10/2019 showed an Unchanged partially FDG-avid left upper lobe mass. \par Unchanged indeterminate mild FDG activity in AP window. New FDG-avid C7 vertebral body lesion, with a pathologic fracture. MRI recommended to evaluate for cord extension. Additional bone lesions, either increased or unchanged. No complaints of any headaches.  Pt still is slightly unsteady when attempts to walk without assistance so pt ambulates with a walker.\par \par His MRI of the cervical spine on 10/10/19 showed metastatic C7 deposit with a pathologic fracture.  No clear extraosseous spread. \par \par He had a brain MRI this morning.  There is no official radiology read.

## 2019-10-17 NOTE — PHYSICAL EXAM
[No Focal Deficits] : no focal deficits [Normal] : oriented to person, place and time, the affect was normal, the mood was normal and not anxious [de-identified] : hoarseness [de-identified] : pain in the neck with neck flexion [de-identified] : a 2 cm flesh colored, irregular, verrucous lesion on the scalp near prior excision site [de-identified] : cranieal nerves 2-12 grossly intact. finger-nose testing slightly shakier on left side. peripheral visual fields intact [de-identified] : ambulating with rolling walker

## 2019-10-17 NOTE — REVIEW OF SYSTEMS
[Recent Change In Weight] : ~T recent weight change [Cough] : cough [Hoarseness] : hoarseness [Disturbance Of Gait] : gait disturbance [Difficulty Walking] : difficulty walking [Negative] : Endocrine [Headache: Grade 0] : Headache: Grade 0 [Peripheral Motor Neuropathy: Grade 1 - Asymptomatic; clinical or diagnostic observations only; intervention not indicated] : Peripheral Motor Neuropathy: Grade 1 - Asymptomatic; clinical or diagnostic observations only; intervention not indicated [Loss of Hearing] : no loss of hearing [Shortness Of Breath] : no shortness of breath [Vomiting] : no vomiting [Abdominal Pain] : no abdominal pain [Diarrhea] : no diarrhea [Constipation] : no constipation [Dizziness] : no dizziness [Confused] : no confusion [Fainting] : no fainting [Easy Bleeding] : no tendency for easy bleeding [Muscle Weakness] : no muscle weakness [FreeTextEntry4] : swallowing and voice are improved [FreeTextEntry2] : lost 15 lb since 4/2019 [Easy Bruising] : no tendency for easy bruising [de-identified] : 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

## 2019-10-22 PROCEDURE — 77332 RADIATION TREATMENT AID(S): CPT | Mod: 26

## 2019-10-22 PROCEDURE — 77290 THER RAD SIMULAJ FIELD CPLX: CPT | Mod: 26

## 2019-10-24 PROCEDURE — 77332 RADIATION TREATMENT AID(S): CPT | Mod: 26

## 2019-10-24 PROCEDURE — 77306 TELETHX ISODOSE PLAN SIMPLE: CPT | Mod: 26

## 2019-10-25 ENCOUNTER — APPOINTMENT (OUTPATIENT)
Dept: HEMATOLOGY ONCOLOGY | Facility: CLINIC | Age: 84
End: 2019-10-25

## 2019-10-30 PROCEDURE — 77431 RADIATION THERAPY MANAGEMENT: CPT

## 2019-10-30 PROCEDURE — 77280 THER RAD SIMULAJ FIELD SMPL: CPT | Mod: 26

## 2019-11-04 ENCOUNTER — OUTPATIENT (OUTPATIENT)
Dept: OUTPATIENT SERVICES | Facility: HOSPITAL | Age: 84
LOS: 1 days | Discharge: HOME | End: 2019-11-04

## 2019-11-04 DIAGNOSIS — C34.90 MALIGNANT NEOPLASM OF UNSPECIFIED PART OF UNSPECIFIED BRONCHUS OR LUNG: ICD-10-CM

## 2019-11-15 ENCOUNTER — RESULT REVIEW (OUTPATIENT)
Age: 84
End: 2019-11-15

## 2019-11-15 ENCOUNTER — APPOINTMENT (OUTPATIENT)
Dept: HEMATOLOGY ONCOLOGY | Facility: CLINIC | Age: 84
End: 2019-11-15
Payer: MEDICARE

## 2019-11-15 VITALS
TEMPERATURE: 98.4 F | SYSTOLIC BLOOD PRESSURE: 124 MMHG | BODY MASS INDEX: 20.03 KG/M2 | RESPIRATION RATE: 16 BRPM | HEART RATE: 80 BPM | DIASTOLIC BLOOD PRESSURE: 70 MMHG | WEIGHT: 147.71 LBS | OXYGEN SATURATION: 99 %

## 2019-11-15 DIAGNOSIS — C79.31 SECONDARY MALIGNANT NEOPLASM OF BRAIN: ICD-10-CM

## 2019-11-15 DIAGNOSIS — C79.49 SECONDARY MALIGNANT NEOPLASM OF BRAIN: ICD-10-CM

## 2019-11-15 DIAGNOSIS — C34.12 MALIGNANT NEOPLASM OF UPPER LOBE, LEFT BRONCHUS OR LUNG: ICD-10-CM

## 2019-11-15 DIAGNOSIS — C77.1 SECONDARY AND UNSPECIFIED MALIGNANT NEOPLASM OF INTRATHORACIC LYMPH NODES: ICD-10-CM

## 2019-11-15 DIAGNOSIS — G89.3 NEOPLASM RELATED PAIN (ACUTE) (CHRONIC): ICD-10-CM

## 2019-11-15 DIAGNOSIS — C78.7 SECONDARY MALIGNANT NEOPLASM OF LIVER AND INTRAHEPATIC BILE DUCT: ICD-10-CM

## 2019-11-15 LAB
BASOPHILS # BLD AUTO: 0 K/UL — SIGNIFICANT CHANGE UP (ref 0–0.2)
BASOPHILS NFR BLD AUTO: 0 % — SIGNIFICANT CHANGE UP (ref 0–2)
EOSINOPHIL # BLD AUTO: 0.1 K/UL — SIGNIFICANT CHANGE UP (ref 0–0.5)
EOSINOPHIL NFR BLD AUTO: 3.1 % — SIGNIFICANT CHANGE UP (ref 0–6)
HCT VFR BLD CALC: 41.4 % — SIGNIFICANT CHANGE UP (ref 39–50)
HGB BLD-MCNC: 14.1 G/DL — SIGNIFICANT CHANGE UP (ref 13–17)
LYMPHOCYTES # BLD AUTO: 1.2 K/UL — SIGNIFICANT CHANGE UP (ref 1–3.3)
LYMPHOCYTES # BLD AUTO: 28.4 % — SIGNIFICANT CHANGE UP (ref 13–44)
MCHC RBC-ENTMCNC: 34 G/DL — SIGNIFICANT CHANGE UP (ref 32–36)
MCHC RBC-ENTMCNC: 34 PG — SIGNIFICANT CHANGE UP (ref 27–34)
MCV RBC AUTO: 100 FL — SIGNIFICANT CHANGE UP (ref 80–100)
MONOCYTES # BLD AUTO: 0.6 K/UL — SIGNIFICANT CHANGE UP (ref 0–0.9)
MONOCYTES NFR BLD AUTO: 14.9 % — HIGH (ref 2–14)
NEUTROPHILS # BLD AUTO: 2.2 K/UL — SIGNIFICANT CHANGE UP (ref 1.8–7.4)
NEUTROPHILS NFR BLD AUTO: 53.5 % — SIGNIFICANT CHANGE UP (ref 43–77)
PLATELET # BLD AUTO: 112 K/UL — LOW (ref 150–400)
RBC # BLD: 4.14 M/UL — LOW (ref 4.2–5.8)
RBC # FLD: 12.4 % — SIGNIFICANT CHANGE UP (ref 10.3–14.5)
WBC # BLD: 4.1 K/UL — SIGNIFICANT CHANGE UP (ref 3.8–10.5)
WBC # FLD AUTO: 4.1 K/UL — SIGNIFICANT CHANGE UP (ref 3.8–10.5)

## 2019-11-15 PROCEDURE — 99214 OFFICE O/P EST MOD 30 MIN: CPT

## 2019-11-18 LAB
ALBUMIN SERPL ELPH-MCNC: 4.4 G/DL
ALP BLD-CCNC: 101 U/L
ALT SERPL-CCNC: 34 U/L
ANION GAP SERPL CALC-SCNC: 15 MMOL/L
AST SERPL-CCNC: 24 U/L
BILIRUB SERPL-MCNC: 0.5 MG/DL
BUN SERPL-MCNC: 16 MG/DL
CALCIUM SERPL-MCNC: 9.4 MG/DL
CEA SERPL-MCNC: 22.2 NG/ML
CHLORIDE SERPL-SCNC: 105 MMOL/L
CO2 SERPL-SCNC: 26 MMOL/L
CREAT SERPL-MCNC: 1.16 MG/DL
GLUCOSE SERPL-MCNC: 113 MG/DL
POTASSIUM SERPL-SCNC: 4.4 MMOL/L
PROT SERPL-MCNC: 6.7 G/DL
SODIUM SERPL-SCNC: 146 MMOL/L

## 2019-12-12 ENCOUNTER — INPATIENT (INPATIENT)
Facility: HOSPITAL | Age: 84
LOS: 7 days | Discharge: HOSPICE | End: 2019-12-20
Attending: HOSPITALIST | Admitting: HOSPITALIST
Payer: MEDICARE

## 2019-12-12 VITALS
SYSTOLIC BLOOD PRESSURE: 115 MMHG | HEART RATE: 83 BPM | TEMPERATURE: 98 F | OXYGEN SATURATION: 100 % | DIASTOLIC BLOOD PRESSURE: 47 MMHG | RESPIRATION RATE: 16 BRPM

## 2019-12-12 DIAGNOSIS — R41.82 ALTERED MENTAL STATUS, UNSPECIFIED: ICD-10-CM

## 2019-12-12 LAB
ALBUMIN SERPL ELPH-MCNC: 4.4 G/DL — SIGNIFICANT CHANGE UP (ref 3.3–5)
ALP SERPL-CCNC: 110 U/L — SIGNIFICANT CHANGE UP (ref 40–120)
ALT FLD-CCNC: 35 U/L — SIGNIFICANT CHANGE UP (ref 4–41)
ANION GAP SERPL CALC-SCNC: 11 MMO/L — SIGNIFICANT CHANGE UP (ref 7–14)
ANION GAP SERPL CALC-SCNC: 9 MMO/L — SIGNIFICANT CHANGE UP (ref 7–14)
APPEARANCE UR: CLEAR — SIGNIFICANT CHANGE UP
APTT BLD: 26.4 SEC — LOW (ref 27.5–36.3)
AST SERPL-CCNC: 30 U/L — SIGNIFICANT CHANGE UP (ref 4–40)
BACTERIA # UR AUTO: NEGATIVE — SIGNIFICANT CHANGE UP
BASOPHILS # BLD AUTO: 0.02 K/UL — SIGNIFICANT CHANGE UP (ref 0–0.2)
BASOPHILS NFR BLD AUTO: 0.4 % — SIGNIFICANT CHANGE UP (ref 0–2)
BILIRUB SERPL-MCNC: 0.6 MG/DL — SIGNIFICANT CHANGE UP (ref 0.2–1.2)
BILIRUB UR-MCNC: NEGATIVE — SIGNIFICANT CHANGE UP
BLOOD UR QL VISUAL: NEGATIVE — SIGNIFICANT CHANGE UP
BUN SERPL-MCNC: 16 MG/DL — SIGNIFICANT CHANGE UP (ref 7–23)
BUN SERPL-MCNC: 16 MG/DL — SIGNIFICANT CHANGE UP (ref 7–23)
CALCIUM SERPL-MCNC: 9.4 MG/DL — SIGNIFICANT CHANGE UP (ref 8.4–10.5)
CALCIUM SERPL-MCNC: 9.7 MG/DL — SIGNIFICANT CHANGE UP (ref 8.4–10.5)
CHLORIDE SERPL-SCNC: 104 MMOL/L — SIGNIFICANT CHANGE UP (ref 98–107)
CHLORIDE SERPL-SCNC: 107 MMOL/L — SIGNIFICANT CHANGE UP (ref 98–107)
CO2 SERPL-SCNC: 26 MMOL/L — SIGNIFICANT CHANGE UP (ref 22–31)
CO2 SERPL-SCNC: 26 MMOL/L — SIGNIFICANT CHANGE UP (ref 22–31)
COLOR SPEC: YELLOW — SIGNIFICANT CHANGE UP
CREAT SERPL-MCNC: 1.15 MG/DL — SIGNIFICANT CHANGE UP (ref 0.5–1.3)
CREAT SERPL-MCNC: 1.18 MG/DL — SIGNIFICANT CHANGE UP (ref 0.5–1.3)
EOSINOPHIL # BLD AUTO: 0.15 K/UL — SIGNIFICANT CHANGE UP (ref 0–0.5)
EOSINOPHIL NFR BLD AUTO: 2.9 % — SIGNIFICANT CHANGE UP (ref 0–6)
GLUCOSE SERPL-MCNC: 89 MG/DL — SIGNIFICANT CHANGE UP (ref 70–99)
GLUCOSE SERPL-MCNC: 93 MG/DL — SIGNIFICANT CHANGE UP (ref 70–99)
GLUCOSE UR-MCNC: NEGATIVE — SIGNIFICANT CHANGE UP
HCT VFR BLD CALC: 40.8 % — SIGNIFICANT CHANGE UP (ref 39–50)
HGB BLD-MCNC: 13.3 G/DL — SIGNIFICANT CHANGE UP (ref 13–17)
HYALINE CASTS # UR AUTO: NEGATIVE — SIGNIFICANT CHANGE UP
IMM GRANULOCYTES NFR BLD AUTO: 0.2 % — SIGNIFICANT CHANGE UP (ref 0–1.5)
INR BLD: 1.03 — SIGNIFICANT CHANGE UP (ref 0.88–1.17)
KETONES UR-MCNC: NEGATIVE — SIGNIFICANT CHANGE UP
LEUKOCYTE ESTERASE UR-ACNC: SIGNIFICANT CHANGE UP
LYMPHOCYTES # BLD AUTO: 1.41 K/UL — SIGNIFICANT CHANGE UP (ref 1–3.3)
LYMPHOCYTES # BLD AUTO: 27.2 % — SIGNIFICANT CHANGE UP (ref 13–44)
MCHC RBC-ENTMCNC: 32 PG — SIGNIFICANT CHANGE UP (ref 27–34)
MCHC RBC-ENTMCNC: 32.6 % — SIGNIFICANT CHANGE UP (ref 32–36)
MCV RBC AUTO: 98.1 FL — SIGNIFICANT CHANGE UP (ref 80–100)
MONOCYTES # BLD AUTO: 0.67 K/UL — SIGNIFICANT CHANGE UP (ref 0–0.9)
MONOCYTES NFR BLD AUTO: 12.9 % — SIGNIFICANT CHANGE UP (ref 2–14)
NEUTROPHILS # BLD AUTO: 2.92 K/UL — SIGNIFICANT CHANGE UP (ref 1.8–7.4)
NEUTROPHILS NFR BLD AUTO: 56.4 % — SIGNIFICANT CHANGE UP (ref 43–77)
NITRITE UR-MCNC: NEGATIVE — SIGNIFICANT CHANGE UP
NRBC # FLD: 0 K/UL — SIGNIFICANT CHANGE UP (ref 0–0)
PH UR: 6.5 — SIGNIFICANT CHANGE UP (ref 5–8)
PLATELET # BLD AUTO: 149 K/UL — LOW (ref 150–400)
PMV BLD: 10.1 FL — SIGNIFICANT CHANGE UP (ref 7–13)
POTASSIUM SERPL-MCNC: 4.7 MMOL/L — SIGNIFICANT CHANGE UP (ref 3.5–5.3)
POTASSIUM SERPL-MCNC: 5.5 MMOL/L — HIGH (ref 3.5–5.3)
POTASSIUM SERPL-SCNC: 4.7 MMOL/L — SIGNIFICANT CHANGE UP (ref 3.5–5.3)
POTASSIUM SERPL-SCNC: 5.5 MMOL/L — HIGH (ref 3.5–5.3)
PROT SERPL-MCNC: 6.9 G/DL — SIGNIFICANT CHANGE UP (ref 6–8.3)
PROT UR-MCNC: NEGATIVE — SIGNIFICANT CHANGE UP
PROTHROM AB SERPL-ACNC: 11.7 SEC — SIGNIFICANT CHANGE UP (ref 9.8–13.1)
RBC # BLD: 4.16 M/UL — LOW (ref 4.2–5.8)
RBC # FLD: 13.3 % — SIGNIFICANT CHANGE UP (ref 10.3–14.5)
RBC CASTS # UR COMP ASSIST: HIGH (ref 0–?)
SODIUM SERPL-SCNC: 141 MMOL/L — SIGNIFICANT CHANGE UP (ref 135–145)
SODIUM SERPL-SCNC: 142 MMOL/L — SIGNIFICANT CHANGE UP (ref 135–145)
SP GR SPEC: 1.01 — SIGNIFICANT CHANGE UP (ref 1–1.04)
SQUAMOUS # UR AUTO: SIGNIFICANT CHANGE UP
TROPONIN T, HIGH SENSITIVITY: 15 NG/L — SIGNIFICANT CHANGE UP (ref ?–14)
TROPONIN T, HIGH SENSITIVITY: 18 NG/L — SIGNIFICANT CHANGE UP (ref ?–14)
TSH SERPL-MCNC: 1.68 UIU/ML — SIGNIFICANT CHANGE UP (ref 0.27–4.2)
UROBILINOGEN FLD QL: NORMAL — SIGNIFICANT CHANGE UP
WBC # BLD: 5.18 K/UL — SIGNIFICANT CHANGE UP (ref 3.8–10.5)
WBC # FLD AUTO: 5.18 K/UL — SIGNIFICANT CHANGE UP (ref 3.8–10.5)
WBC UR QL: HIGH (ref 0–?)

## 2019-12-12 PROCEDURE — 71045 X-RAY EXAM CHEST 1 VIEW: CPT | Mod: 26

## 2019-12-12 PROCEDURE — 99223 1ST HOSP IP/OBS HIGH 75: CPT

## 2019-12-12 PROCEDURE — 70450 CT HEAD/BRAIN W/O DYE: CPT | Mod: 26

## 2019-12-12 RX ORDER — ACETAMINOPHEN 500 MG
650 TABLET ORAL ONCE
Refills: 0 | Status: COMPLETED | OUTPATIENT
Start: 2019-12-12 | End: 2019-12-12

## 2019-12-12 RX ORDER — TAMSULOSIN HYDROCHLORIDE 0.4 MG/1
1 CAPSULE ORAL
Qty: 0 | Refills: 0 | DISCHARGE

## 2019-12-12 RX ORDER — SODIUM CHLORIDE 9 MG/ML
1000 INJECTION INTRAMUSCULAR; INTRAVENOUS; SUBCUTANEOUS ONCE
Refills: 0 | Status: COMPLETED | OUTPATIENT
Start: 2019-12-12 | End: 2019-12-12

## 2019-12-12 RX ORDER — ASPIRIN/CALCIUM CARB/MAGNESIUM 324 MG
1 TABLET ORAL
Qty: 0 | Refills: 0 | DISCHARGE

## 2019-12-12 RX ADMIN — Medication 650 MILLIGRAM(S): at 18:21

## 2019-12-12 RX ADMIN — SODIUM CHLORIDE 1000 MILLILITER(S): 9 INJECTION INTRAMUSCULAR; INTRAVENOUS; SUBCUTANEOUS at 15:21

## 2019-12-12 NOTE — H&P ADULT - ASSESSMENT
89 y/o M with NSCLC with bone and brain mets s/p radiation on Tagrisso, HTN, BPH p/w confusion and agitation.

## 2019-12-12 NOTE — ED PROVIDER NOTE - PHYSICAL EXAMINATION
General: Elderly male, in no acute distress   HEENT: Normocephalic and atraumatic, Trachea midline.   Cardiac: Normal S1 and S2 w/ RRR. No MRG.  Pulmonary: Scatted wheezing and coarse breath sounds.   Abdominal: Soft, NTND  Neurologic: Awake, oriented to person. No focal sensory or motor deficits.  Musculoskeletal: No limited ROM.  Vascular: Warm and well perfused  Skin: Color appropriate for race.   Psychiatric: pleasant affect. No apparent risk to self or others.  Theo De La Vega M.D. PGY-2

## 2019-12-12 NOTE — ED PROVIDER NOTE - CLINICAL SUMMARY MEDICAL DECISION MAKING FREE TEXT BOX
89M with metastatic lung Ca p/w worsening confusion, agitation. Pt cooperative at this time.  Likely metabolic derangements, vs worsening of disease, vs dementia. Labs, EKG, CXR CT head.

## 2019-12-12 NOTE — ED PROVIDER NOTE - NS ED ROS FT
REVIEW OF SYSTEMS:  General:  +altered mental status. no fever, no chills  HEENT: no headache, no vision changes  Cardiac: no chest pain, no palpitations  Respiratory: no cough, no shortness of breath  Gastrointestinal: no abdominal pain, no nausea, no vomiting, no diarrhea  Genitourinary: no hematuria, no dysuria, no urinary frequency  Extremities: no extremity swelling, no extremity pain  Neuro: no focal weakness, no numbness/tingling of the extremities, no decreased sensation  Heme: no easy bleeding, no easy bruising  Skin: no jaundice,  no rashes, no lesions  All other ROS as documented in HPI  -Theo De La Vega, PGY-2

## 2019-12-12 NOTE — H&P ADULT - NSICDXPASTMEDICALHX_GEN_ALL_CORE_FT
PAST MEDICAL HISTORY:  BPH (benign prostatic hyperplasia)     Essential hypertension     Non-small cell lung cancer (NSCLC)

## 2019-12-12 NOTE — ED PROVIDER NOTE - ATTENDING CONTRIBUTION TO CARE
DR. CHEN, ATTENDING MD-  I performed a face to face bedside interview with the patient regarding history of present illness, review of symptoms and past medical history. I completed an independent physical exam.  I have discussed the patient's plan of care with the resident.   Documentation as above in the note.    89 y/o male with h/o active lung ca bib family for agitation x2 wks.  Brain mets vs lyte abn vs occult infection vs thyroid dysfunction vs dementia.  Obtain ct head, cxr, ua, labs, admit for further care and eval.

## 2019-12-12 NOTE — H&P ADULT - PROBLEM SELECTOR PLAN 1
Possible developing dementia vs effect of brain mets.    - will need to consult neurology and psych in am  - no evidence of infection

## 2019-12-12 NOTE — H&P ADULT - PROBLEM SELECTOR PLAN 6
Transitions of Care Status:  1.  Name of PCP: Dr Fox (oncology)  2.  PCP Contacted on Admission: [ ] Y    [x] N    3.  PCP contacted at Discharge: [ ] Y    [ ] N    [ ] N/A  4.  Post-Discharge Appointment Date and Location:  5.  Summary of Handoff given to PCP:

## 2019-12-12 NOTE — H&P ADULT - HISTORY OF PRESENT ILLNESS
89 y/o M with NSCLC with bone and brain mets s/p radiation on Tagrisso, HTN, BPH p/w confusion and agitation.  Pt's son-in-law reports that starting late October to early November, pt started developing worsening confusion.  He states that pt at baseline had no signs of cognitive decline.  He has become prone to angry outbursts where previously was calm.  Family reports that pt has been "talking nonsense".  No recent fever or chills.  No other new s 89 y/o M with NSCLC with bone and brain mets s/p radiation on Tagrisso, HTN, BPH p/w confusion and agitation.  Pt's son-in-law reports that starting late October to early November, pt started developing worsening confusion.  He states that pt at baseline had no signs of cognitive decline.  He has become prone to angry outbursts where previously was calm.  Family reports that pt has been "talking nonsense".  No recent fever or chills.  No other new symptoms.  Per family pt underwent radiation to new neck lesion seen on PET on 10/22.  He also was started on medical marijuana around the same time.      Pt is unsure why he is in the hospital, but reports no complaints.     In the ED: pt was given Tylenol and 1L NS.

## 2019-12-12 NOTE — PATIENT PROFILE ADULT - NSPROSPIRITUALVALUESFT_GEN_A_NUR
Dad states that Geovany got hand, foot and mouth over the weekend.  Dad states that he has never really had a fever with it but he has a lot of lesions on his hands and feet.  Dad is wondering if he should reschedule his flu shot or not.    Advised him that as long as he is fever free for 24 hours prior to the shot, he would be okay to get it.    Discussed signs per AAP.  Discussed home care advice for Hand-Foot-Mouth per AAP guidelines/protocol.  Explained to dad that this is viral infection.  Advised dad to encourage favorite fluids to prevent dehydration (cold drinks, milk shakes, popsicles, slushes, and sherbet are good choices.  Avoid citrus, salty or spicy foods.  Solid foods not as important as fluid intake.  Discussed  Contagiousness (incubation period is 3 - 6 days ~ child may return to day care when the temperature returns to normal in 1 - 3 days).  Explained to dad that mouth ulcers typically resolve within 7 days, and the rash on hands/feet may last up to 10 days.  Advised dad to monitor and call back if signs of dehydration develop (discussed), fever present > 3 days, child starts looking/acting sick, is extra irritable or inconsolable, any other s/s of concern develop, dad has further questions/concerns, etc.       Advised Dad to call back with any further questions or concerns.    Dad verbalized understanding of information provided, will monitor, and follow up with office as needed.     none

## 2019-12-12 NOTE — H&P ADULT - NSHPLABSRESULTS_GEN_ALL_CORE
142  |  107  |  16  ----------------------------<  89  4.7   |  26  |  1.15    Ca    9.4      12 Dec 2019 16:35    TPro  6.9  /  Alb  4.4  /  TBili  0.6  /  DBili  x   /  AST  30  /  ALT  35  /  AlkPhos  110                              13.3   5.18  )-----------( 149      ( 12 Dec 2019 15:17 )             40.8             PT/INR - ( 12 Dec 2019 15:17 )   PT: 11.7 SEC;   INR: 1.03          PTT - ( 12 Dec 2019 15:17 )  PTT:26.4 SEC    Urinalysis Basic - ( 12 Dec 2019 18:00 )    Color: YELLOW / Appearance: CLEAR / S.015 / pH: 6.5  Gluc: NEGATIVE / Ketone: NEGATIVE  / Bili: NEGATIVE / Urobili: NORMAL   Blood: NEGATIVE / Protein: NEGATIVE / Nitrite: NEGATIVE   Leuk Esterase: SMALL / RBC: 6-10 / WBC 6-10   Sq Epi: FEW / Non Sq Epi: x / Bacteria: NEGATIVE    < from: CT Head No Cont (19 @ 17:07) >    Stable exam. No acute intracranial hemorrhage, mass effectvasogenic   edema or midline shift.     < end of copied text >    < from: Xray Chest 1 View AP/PA (19 @ 17:13) >    INTERPRETATION:  Left upper lung mass as seen in previous PET-CT   10/1/2019.    < end of copied text >

## 2019-12-12 NOTE — ED PROVIDER NOTE - OBJECTIVE STATEMENT
89M PMH metastatic Lung Ca s/p chemo and radiation, HTN, HLD, BPH p/w AMS. As per family at bedside, pt initially had very good response to chemo therapy. Aprox 1 month ago he had additional radiation for mets to cervical spine. Around this time pt was also started on medical marijuana formulation for apatite stimulation. Around this time family noticed pt started to develop s/s of dementia. They stopped the marijuana but pt continued to become increasing altered and have personality changes. He is less 89M PMH metastatic Lung Ca s/p chemo and radiation, HTN, HLD, BPH p/w AMS. As per family at bedside, pt initially had very good response to chemo therapy. Aprox 1 month ago he had additional radiation for mets to cervical spine. Around this time pt was also started on medical marijuana formulation for apatite stimulation. Around this time family noticed pt started to develop s/s of dementia. They stopped the marijuana but pt continued to become increasing altered and have personality changes. He is intermittently becoming very agitated, confused. Family states they have trouble getting pt to drink water. They deny focal symptoms such as cough, vomiting, diarrhea.

## 2019-12-12 NOTE — ED ADULT TRIAGE NOTE - CHIEF COMPLAINT QUOTE
A&OX2 person place c/o  pt becoming more aggressive agitated and restless over past two weeks, as per family pt has undiagnosed dementia currently for Lung CA pt takes a medication daily, denies n/v/, pt calm in triage,

## 2019-12-12 NOTE — H&P ADULT - NSHPPHYSICALEXAM_GEN_ALL_CORE
Vital Signs Last 24 Hrs  T(C): 36.6 (12 Dec 2019 20:45), Max: 36.6 (12 Dec 2019 13:29)  T(F): 97.9 (12 Dec 2019 20:45), Max: 97.9 (12 Dec 2019 13:29)  HR: 68 (12 Dec 2019 20:45) (64 - 83)  BP: 124/61 (12 Dec 2019 20:45) (105/78 - 124/61)  BP(mean): --  RR: 16 (12 Dec 2019 20:45) (16 - 18)  SpO2: 99% (12 Dec 2019 20:45) (94% - 100%)    PHYSICAL EXAM:  GENERAL: No Acute Distress  EYES: conjunctiva and sclera clear  ENMT: Moist mucous membranes   NECK: Supple  PULMONARY: Clear to auscultation bilaterally  CARDIAC: Regular rate and rhythm; No murmurs, rubs, or gallops  GASTROINTESTINAL: Abdomen soft, Nontender, Nondistended; Bowel sounds normal  EXTREMITIES:   No clubbing, cyanosis, or pedal edema  PSYCH: Normal Affect, Normal Behavior  NEUROLOGY:   - Mental status A&O x 1-2  SKIN: No rashes or lesions  MUSCULOSKELETAL: No joint swelling

## 2019-12-12 NOTE — ED ADULT NURSE NOTE - OBJECTIVE STATEMENT
Pt presents to rm 16, A&Ox2- disoriented to time, ambulatory w/o assistance, pmhx of lung CA- in remission, last radiation in October. Here for evaluation of AMS, as per wife at bedside pt has been showing signs/ symptoms of dementia- becoming increasing confused and mildly agitated at times. Pt wife states they have noticed these symptoms after radiation in October. Family also state pt has not been drinking enough water. Denies chest pain, shortness of breath, palpitations, diaphoresis, headaches, fevers, dizziness, nausea, vomiting, diarrhea, or urinary symptoms at this time. IV established in right arm with a 20G, labs drawn and sent, call bell in reach, warm blanket provided, bed in lowest position, side rails up x2, MD evaluation in progress. Will continue to monitor.

## 2019-12-13 DIAGNOSIS — Z02.9 ENCOUNTER FOR ADMINISTRATIVE EXAMINATIONS, UNSPECIFIED: ICD-10-CM

## 2019-12-13 DIAGNOSIS — R41.0 DISORIENTATION, UNSPECIFIED: ICD-10-CM

## 2019-12-13 DIAGNOSIS — F03.91 UNSPECIFIED DEMENTIA WITH BEHAVIORAL DISTURBANCE: ICD-10-CM

## 2019-12-13 DIAGNOSIS — N40.0 BENIGN PROSTATIC HYPERPLASIA WITHOUT LOWER URINARY TRACT SYMPTOMS: ICD-10-CM

## 2019-12-13 DIAGNOSIS — C34.92 MALIGNANT NEOPLASM OF UNSPECIFIED PART OF LEFT BRONCHUS OR LUNG: ICD-10-CM

## 2019-12-13 DIAGNOSIS — I10 ESSENTIAL (PRIMARY) HYPERTENSION: ICD-10-CM

## 2019-12-13 DIAGNOSIS — Z29.9 ENCOUNTER FOR PROPHYLACTIC MEASURES, UNSPECIFIED: ICD-10-CM

## 2019-12-13 LAB
ANION GAP SERPL CALC-SCNC: 11 MMO/L — SIGNIFICANT CHANGE UP (ref 7–14)
BUN SERPL-MCNC: 15 MG/DL — SIGNIFICANT CHANGE UP (ref 7–23)
CALCIUM SERPL-MCNC: 9.5 MG/DL — SIGNIFICANT CHANGE UP (ref 8.4–10.5)
CHLORIDE SERPL-SCNC: 107 MMOL/L — SIGNIFICANT CHANGE UP (ref 98–107)
CO2 SERPL-SCNC: 24 MMOL/L — SIGNIFICANT CHANGE UP (ref 22–31)
CREAT SERPL-MCNC: 1.12 MG/DL — SIGNIFICANT CHANGE UP (ref 0.5–1.3)
GLUCOSE SERPL-MCNC: 86 MG/DL — SIGNIFICANT CHANGE UP (ref 70–99)
HCT VFR BLD CALC: 39.2 % — SIGNIFICANT CHANGE UP (ref 39–50)
HGB BLD-MCNC: 12.8 G/DL — LOW (ref 13–17)
MAGNESIUM SERPL-MCNC: 2 MG/DL — SIGNIFICANT CHANGE UP (ref 1.6–2.6)
MCHC RBC-ENTMCNC: 31.4 PG — SIGNIFICANT CHANGE UP (ref 27–34)
MCHC RBC-ENTMCNC: 32.7 % — SIGNIFICANT CHANGE UP (ref 32–36)
MCV RBC AUTO: 96.3 FL — SIGNIFICANT CHANGE UP (ref 80–100)
NRBC # FLD: 0 K/UL — SIGNIFICANT CHANGE UP (ref 0–0)
PLATELET # BLD AUTO: 125 K/UL — LOW (ref 150–400)
PMV BLD: 10.2 FL — SIGNIFICANT CHANGE UP (ref 7–13)
POTASSIUM SERPL-MCNC: 4.5 MMOL/L — SIGNIFICANT CHANGE UP (ref 3.5–5.3)
POTASSIUM SERPL-SCNC: 4.5 MMOL/L — SIGNIFICANT CHANGE UP (ref 3.5–5.3)
RBC # BLD: 4.07 M/UL — LOW (ref 4.2–5.8)
RBC # FLD: 13.2 % — SIGNIFICANT CHANGE UP (ref 10.3–14.5)
SODIUM SERPL-SCNC: 142 MMOL/L — SIGNIFICANT CHANGE UP (ref 135–145)
WBC # BLD: 4.72 K/UL — SIGNIFICANT CHANGE UP (ref 3.8–10.5)
WBC # FLD AUTO: 4.72 K/UL — SIGNIFICANT CHANGE UP (ref 3.8–10.5)

## 2019-12-13 PROCEDURE — 99233 SBSQ HOSP IP/OBS HIGH 50: CPT

## 2019-12-13 PROCEDURE — 93010 ELECTROCARDIOGRAM REPORT: CPT

## 2019-12-13 PROCEDURE — 90792 PSYCH DIAG EVAL W/MED SRVCS: CPT

## 2019-12-13 PROCEDURE — 99223 1ST HOSP IP/OBS HIGH 75: CPT

## 2019-12-13 RX ORDER — HALOPERIDOL DECANOATE 100 MG/ML
0.25 INJECTION INTRAMUSCULAR EVERY 8 HOURS
Refills: 0 | Status: DISCONTINUED | OUTPATIENT
Start: 2019-12-13 | End: 2019-12-17

## 2019-12-13 RX ORDER — TAMSULOSIN HYDROCHLORIDE 0.4 MG/1
0.4 CAPSULE ORAL AT BEDTIME
Refills: 0 | Status: DISCONTINUED | OUTPATIENT
Start: 2019-12-13 | End: 2019-12-20

## 2019-12-13 RX ORDER — ATORVASTATIN CALCIUM 80 MG/1
40 TABLET, FILM COATED ORAL AT BEDTIME
Refills: 0 | Status: DISCONTINUED | OUTPATIENT
Start: 2019-12-13 | End: 2019-12-20

## 2019-12-13 RX ORDER — FINASTERIDE 5 MG/1
5 TABLET, FILM COATED ORAL DAILY
Refills: 0 | Status: DISCONTINUED | OUTPATIENT
Start: 2019-12-13 | End: 2019-12-20

## 2019-12-13 RX ORDER — ASPIRIN/CALCIUM CARB/MAGNESIUM 324 MG
81 TABLET ORAL DAILY
Refills: 0 | Status: DISCONTINUED | OUTPATIENT
Start: 2019-12-13 | End: 2019-12-20

## 2019-12-13 RX ORDER — LISINOPRIL 2.5 MG/1
20 TABLET ORAL DAILY
Refills: 0 | Status: DISCONTINUED | OUTPATIENT
Start: 2019-12-13 | End: 2019-12-19

## 2019-12-13 RX ORDER — AMLODIPINE BESYLATE 2.5 MG/1
2.5 TABLET ORAL DAILY
Refills: 0 | Status: DISCONTINUED | OUTPATIENT
Start: 2019-12-13 | End: 2019-12-19

## 2019-12-13 RX ORDER — OSIMERTINIB 80 1/1
80 TABLET, FILM COATED ORAL DAILY
Refills: 0 | Status: DISCONTINUED | OUTPATIENT
Start: 2019-12-13 | End: 2019-12-20

## 2019-12-13 RX ADMIN — ATORVASTATIN CALCIUM 40 MILLIGRAM(S): 80 TABLET, FILM COATED ORAL at 22:51

## 2019-12-13 RX ADMIN — Medication 81 MILLIGRAM(S): at 11:16

## 2019-12-13 RX ADMIN — TAMSULOSIN HYDROCHLORIDE 0.4 MILLIGRAM(S): 0.4 CAPSULE ORAL at 21:05

## 2019-12-13 RX ADMIN — HALOPERIDOL DECANOATE 0.25 MILLIGRAM(S): 100 INJECTION INTRAMUSCULAR at 22:50

## 2019-12-13 RX ADMIN — LISINOPRIL 20 MILLIGRAM(S): 2.5 TABLET ORAL at 06:32

## 2019-12-13 RX ADMIN — FINASTERIDE 5 MILLIGRAM(S): 5 TABLET, FILM COATED ORAL at 11:16

## 2019-12-13 RX ADMIN — AMLODIPINE BESYLATE 2.5 MILLIGRAM(S): 2.5 TABLET ORAL at 06:32

## 2019-12-13 RX ADMIN — OSIMERTINIB 80 MILLIGRAM(S): 80 TABLET, FILM COATED ORAL at 18:22

## 2019-12-13 NOTE — CHART NOTE - NSCHARTNOTEFT_GEN_A_CORE
Appreciate Heme consult. Recommendation to obtain MRI brain w/wo contrast. Writer spoke with patient's daughter/proxy Rachael Gooden [phone]. She is consenting to MRI. Also reports patient's last MRi completed in May. Plan discussed with Dr. Nieto. Order placed.

## 2019-12-13 NOTE — PROGRESS NOTE ADULT - ASSESSMENT
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

## 2019-12-13 NOTE — PROGRESS NOTE ADULT - PROBLEM SELECTOR PLAN 1
Suspect progression of the brain metastasis.  Oncology recommend repeat MRI of Brain.  CT Head reviewed.  Psychiatry consult for behavior disturbance.  Difficult disposition due to behavior disturbance at home for the family at this time.

## 2019-12-13 NOTE — BEHAVIORAL HEALTH ASSESSMENT NOTE - NSBHCHARTREVIEWVS_PSY_A_CORE FT
Vital Signs Last 24 Hrs  T(C): 36.5 (13 Dec 2019 11:45), Max: 36.6 (12 Dec 2019 13:29)  T(F): 97.7 (13 Dec 2019 11:45), Max: 97.9 (12 Dec 2019 13:29)  HR: 80 (13 Dec 2019 11:45) (64 - 83)  BP: 121/66 (13 Dec 2019 11:45) (103/56 - 124/61)  BP(mean): --  RR: 19 (13 Dec 2019 11:45) (16 - 19)  SpO2: 99% (13 Dec 2019 11:45) (94% - 100%)

## 2019-12-13 NOTE — PROGRESS NOTE ADULT - PROBLEM SELECTOR PLAN 5
1) PCP Contacted on Admission: [ x ] Y     [  ] N     [  ] N/A - [facility] - Dr. Lama  2) Date of Contact with PCP: 12/13  3) PCP Contacted at Discharge:  [  ] Y    [  ] N    [  ] N/A -   4) Summary of Handoff Given to PCP:   5) Post-Discharge Appointment Date and Location:

## 2019-12-13 NOTE — BEHAVIORAL HEALTH ASSESSMENT NOTE - SUMMARY
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  Psychiatry consulted for agitation management. Patient has been calm, with no behavioral issues. No prn needs.   Confused, disoriented, confabulation. Denies any mood symptoms, denies any si or hi, denies any a.vh or paranoia when asked. Attempted to contact family at [phone]- no answer- left  for call back    Recommendations  - Continue current observation status  - Coordinate care with family  - Check qtc- no ekg in chart  - Check Vitamin b12 level, folate level, RPR  - Agree with neurology consult  - Melatonin 3mg q 8pm po- for sleep wake reversal associated with dementia  - No other standing medications for now.   - AGGRESSION NOT RESPONDING TO VERBAL REDIRECTION- Haldol 0.25mg q 8hrs prn im/iv/po- hold for qtc>=500

## 2019-12-13 NOTE — BEHAVIORAL HEALTH ASSESSMENT NOTE - HPI (INCLUDE ILLNESS QUALITY, SEVERITY, DURATION, TIMING, CONTEXT, MODIFYING FACTORS, ASSOCIATED SIGNS AND SYMPTOMS)
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  Psychiatry consulted for agitation management. Patient has been calm, with no behavioral issues. No prn needs.     Met with the patient. Confused, disoriented, confabulation. Denies any mood symptoms, denies any si or hi, denies any a.vh or paranoia when asked.   Attempted to contact family at [phone]- no answer- left  for call back

## 2019-12-13 NOTE — CONSULT NOTE ADULT - ASSESSMENT
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   Concern for progression of brain mets.    -Brain MRI With and without contrast  -Can c/w tagrisso  -  -Supportive care, pain control, Nutrition, PT, DVT ppx  -Outpatient oncology f/u    Will follow. Please do not hesitate to call back with questions.     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     Concern for progression of brain mets.  He was found to have progression of mets in spine recently and had RT to spine.     -Brain MRI With and without contrast  -Can c/w tagrisso for now  -Family requesting medication to calm patient down, he is agitated and that is causing distress to his family  -Supportive care, pain control, Nutrition, PT, DVT ppx  -Outpatient oncology f/u    Will follow. Please do not hesitate to call back with questions.     Paz Lama MD  Medical Oncology Attending  C: [phone]

## 2019-12-13 NOTE — PHYSICAL THERAPY INITIAL EVALUATION ADULT - ADDITIONAL COMMENTS
History obtained from chart - pt lives with his Son and daughter-in-law in house with 3 steps to enter.   Pt has a walker. Unknown how much assistance pt needs prior to admission - no family at bedside and pt unable to provide information due to mental status.

## 2019-12-13 NOTE — PROGRESS NOTE ADULT - SUBJECTIVE AND OBJECTIVE BOX
[facility] Division of Hospital Medicine  Dilan Nieto MD  Pager (GEMA-MARCELLA, 0X-5P): [phone]  Other Times:  [phone]    Patient is a 89y old  Male who presents with a chief complaint of Confusion, agitation (13 Dec 2019 10:25)    SUBJECTIVE / OVERNIGHT EVENTS:  Patient had no episode of agitation overnight or this morning.  Calm but has dementia - history taking limited, and tended to go off tangents on conversation.  No F/C, N/V, CP, SOB, Cough, lightheadedness, dizziness, abdominal pain, diarrhea, dysuria.    MEDICATIONS  (STANDING):  amLODIPine   Tablet 2.5 milliGRAM(s) Oral daily  aspirin enteric coated 81 milliGRAM(s) Oral daily  atorvastatin 40 milliGRAM(s) Oral at bedtime  finasteride 5 milliGRAM(s) Oral daily  lisinopril 20 milliGRAM(s) Oral daily  tamsulosin 0.4 milliGRAM(s) Oral at bedtime    MEDICATIONS  (PRN):      Vital Signs Last 24 Hrs  T(C): 36.4 (13 Dec 2019 06:30), Max: 36.6 (12 Dec 2019 13:29)  T(F): 97.5 (13 Dec 2019 06:30), Max: 97.9 (12 Dec 2019 13:29)  HR: 76 (13 Dec 2019 06:30) (64 - 83)  BP: 103/56 (13 Dec 2019 06:30) (103/56 - 124/61)  BP(mean): --  RR: 16 (13 Dec 2019 06:30) (16 - 18)  SpO2: 100% (13 Dec 2019 06:30) (94% - 100%)  CAPILLARY BLOOD GLUCOSE      POCT Blood Glucose.: 80 mg/dL (12 Dec 2019 15:47)    I&O's Summary      PHYSICAL EXAM:  GENERAL: NAD, well-developed  HEAD:  Atraumatic, Normocephalic  EYES: EOMI, PERRLA, conjunctiva and sclera clear  NECK: Supple, No JVD  CHEST/LUNG: Clear to auscultation bilaterally; No wheeze  HEART: Regular rate and rhythm; No murmurs, rubs, or gallops  ABDOMEN: Soft, Nontender, Nondistended; Bowel sounds present  EXTREMITIES:  2+ Peripheral Pulses, No clubbing, cyanosis, or edema  PSYCH: Calm  NEUROLOGY: A/Ox1, non-focal motor deficits.  SKIN: Multiple sunspots.    LABS:                        12.8   4.72  )-----------( 125      ( 13 Dec 2019 06:00 )             39.2         142  |  107  |  15  ----------------------------<  86  4.5   |  24  |  1.12    Ca    9.5      13 Dec 2019 06:00  Mg     2.0         TPro  6.9  /  Alb  4.4  /  TBili  0.6  /  DBili  x   /  AST  30  /  ALT  35  /  AlkPhos  110  12    PT/INR - ( 12 Dec 2019 15:17 )   PT: 11.7 SEC;   INR: 1.03          PTT - ( 12 Dec 2019 15:17 )  PTT:26.4 SEC      Urinalysis Basic - ( 12 Dec 2019 18:00 )    Color: YELLOW / Appearance: CLEAR / S.015 / pH: 6.5  Gluc: NEGATIVE / Ketone: NEGATIVE  / Bili: NEGATIVE / Urobili: NORMAL   Blood: NEGATIVE / Protein: NEGATIVE / Nitrite: NEGATIVE   Leuk Esterase: SMALL / RBC: 6-10 / WBC 6-10   Sq Epi: FEW / Non Sq Epi: x / Bacteria: NEGATIVE        RADIOLOGY & ADDITIONAL TESTS:    Imaging Personally Reviewed:    Care Discussed with Consultants/Other Providers:

## 2019-12-13 NOTE — BEHAVIORAL HEALTH ASSESSMENT NOTE - NSBHCHARTREVIEWLAB_PSY_A_CORE FT
CBC Full  -  ( 13 Dec 2019 06:00 )  WBC Count : 4.72 K/uL  RBC Count : 4.07 M/uL  Hemoglobin : 12.8 g/dL  Hematocrit : 39.2 %  Platelet Count - Automated : 125 K/uL  Mean Cell Volume : 96.3 fL  Mean Cell Hemoglobin : 31.4 pg  Mean Cell Hemoglobin Concentration : 32.7 %  Auto Neutrophil # : x  Auto Lymphocyte # : x  Auto Monocyte # : x  Auto Eosinophil # : x  Auto Basophil # : x  Auto Neutrophil % : x  Auto Lymphocyte % : x  Auto Monocyte % : x  Auto Eosinophil % : x  Auto Basophil % : x  12-13    142  |  107  |  15  ----------------------------<  86  4.5   |  24  |  1.12    Ca    9.5      13 Dec 2019 06:00  Mg     2.0     12-13    TPro  6.9  /  Alb  4.4  /  TBili  0.6  /  DBili  x   /  AST  30  /  ALT  35  /  AlkPhos  110  12-12

## 2019-12-13 NOTE — CONSULT NOTE ADULT - SUBJECTIVE AND OBJECTIVE BOX
Patient is a 89y old  Male who presents with a chief complaint of Confusion, agitation (12 Dec 2019 23:27)      HPI:  89 y/o M with NSCLC with bone and brain mets s/p radiation on Tagrisso, HTN, BPH p/w confusion and agitation.  Pt's son-in-law reports that starting late October to early November, pt started developing worsening confusion.  He states that pt at baseline had no signs of cognitive decline.  He has become prone to angry outbursts where previously was calm.  Family reports that pt has been "talking nonsense".  No recent fever or chills.  No other new symptoms.  Per family pt underwent radiation to new neck lesion seen on PET on 10/22.  He also was started on medical marijuana around the same time.      Pt is unsure why he is in the hospital, but reports no complaints.     In the ED: pt was given Tylenol and 1L NS. (12 Dec 2019 23:27)       Oncologic History:      ROS: as above     PAST MEDICAL & SURGICAL HISTORY:  BPH (benign prostatic hyperplasia)  Essential hypertension  Non-small cell lung cancer (NSCLC)      SOCIAL HISTORY:    FAMILY HISTORY:  No pertinent family history in first degree relatives      MEDICATIONS  (STANDING):  amLODIPine   Tablet 2.5 milliGRAM(s) Oral daily  aspirin enteric coated 81 milliGRAM(s) Oral daily  atorvastatin 40 milliGRAM(s) Oral at bedtime  finasteride 5 milliGRAM(s) Oral daily  lisinopril 20 milliGRAM(s) Oral daily  tamsulosin 0.4 milliGRAM(s) Oral at bedtime    MEDICATIONS  (PRN):      Allergies    No Known Allergies    Intolerances        Vital Signs Last 24 Hrs  T(C): 36.4 (13 Dec 2019 06:30), Max: 36.6 (12 Dec 2019 13:29)  T(F): 97.5 (13 Dec 2019 06:30), Max: 97.9 (12 Dec 2019 13:29)  HR: 76 (13 Dec 2019 06:30) (64 - 83)  BP: 103/56 (13 Dec 2019 06:30) (103/56 - 124/61)  BP(mean): --  RR: 16 (13 Dec 2019 06:30) (16 - 18)  SpO2: 100% (13 Dec 2019 06:30) (94% - 100%)    PHYSICAL EXAM  General: adult in NAD  HEENT: clear oropharynx, anicteric sclera, pink conjunctiva  Neck: supple  CV: normal S1/S2 with no murmur rubs or gallops  Lungs: positive air movement b/l ant lungs, clear to auscultation, no wheezes, no rales  Abdomen: soft non-tender non-distended, no hepatosplenomegaly  Ext: no clubbing cyanosis or edema  Skin: no rashes and no petechiae  Neuro: alert and oriented X 3, none focal    LABS:                          12.8   4.72  )-----------( 125      ( 13 Dec 2019 06:00 )             39.2         Mean Cell Volume : 96.3 fL  Mean Cell Hemoglobin : 31.4 pg  Mean Cell Hemoglobin Concentration : 32.7 %  Auto Neutrophil # : x  Auto Lymphocyte # : x  Auto Monocyte # : x  Auto Eosinophil # : x  Auto Basophil # : x  Auto Neutrophil % : x  Auto Lymphocyte % : x  Auto Monocyte % : x  Auto Eosinophil % : x  Auto Basophil % : x      Serial CBC's  12-13 @ 06:00  Hct-39.2 / Hgb-12.8 / Plat-125 / RBC-4.07 / WBC-4.72  Serial CBC's  12-12 @ 15:17  Hct-40.8 / Hgb-13.3 / Plat-149 / RBC-4.16 / WBC-5.18      12-13    142  |  107  |  15  ----------------------------<  86  4.5   |  24  |  1.12    Ca    9.5      13 Dec 2019 06:00  Mg     2.0     12-13    TPro  6.9  /  Alb  4.4  /  TBili  0.6  /  DBili  x   /  AST  30  /  ALT  35  /  AlkPhos  110  12-12      PT/INR - ( 12 Dec 2019 15:17 )   PT: 11.7 SEC;   INR: 1.03          PTT - ( 12 Dec 2019 15:17 )  PTT:26.4 SEC                RADIOLOGY & ADDITIONAL STUDIES: Patient is a 89y old  Male who presents with a chief complaint of Confusion, agitation (12 Dec 2019 23:27)      HPI:  89 y/o M with NSCLC with bone and brain mets s/p radiation on Tagrisso, HTN, BPH p/w confusion and agitation.  Pt's son-in-law reports that starting late October to early November, pt started developing worsening confusion.  He states that pt at baseline had no signs of cognitive decline.  He has become prone to angry outbursts where previously was calm.  Family reports that pt has been "talking nonsense".  No recent fever or chills.  No other new symptoms.  Per family pt underwent radiation to new neck lesion seen on PET on 10/22.  He also was started on medical marijuana around the same time.      Pt is unsure why he is in the hospital, but reports no complaints.     In the ED: pt was given Tylenol and 1L NS. (12 Dec 2019 23:27)         ROS: as above     PAST MEDICAL & SURGICAL HISTORY:  BPH (benign prostatic hyperplasia)  Essential hypertension  Non-small cell lung cancer (NSCLC)      SOCIAL HISTORY:    FAMILY HISTORY:  No pertinent family history in first degree relatives      MEDICATIONS  (STANDING):  amLODIPine   Tablet 2.5 milliGRAM(s) Oral daily  aspirin enteric coated 81 milliGRAM(s) Oral daily  atorvastatin 40 milliGRAM(s) Oral at bedtime  finasteride 5 milliGRAM(s) Oral daily  lisinopril 20 milliGRAM(s) Oral daily  tamsulosin 0.4 milliGRAM(s) Oral at bedtime    MEDICATIONS  (PRN):      Allergies    No Known Allergies    Intolerances        Vital Signs Last 24 Hrs  T(C): 36.4 (13 Dec 2019 06:30), Max: 36.6 (12 Dec 2019 13:29)  T(F): 97.5 (13 Dec 2019 06:30), Max: 97.9 (12 Dec 2019 13:29)  HR: 76 (13 Dec 2019 06:30) (64 - 83)  BP: 103/56 (13 Dec 2019 06:30) (103/56 - 124/61)  BP(mean): --  RR: 16 (13 Dec 2019 06:30) (16 - 18)  SpO2: 100% (13 Dec 2019 06:30) (94% - 100%)    PHYSICAL EXAM  General: adult in NAD  HEENT: clear oropharynx, anicteric sclera, pink conjunctiva  Neck: supple  CV: normal S1/S2 with no murmur rubs or gallops  Lungs: positive air movement b/l ant lungs, clear to auscultation, no wheezes, no rales  Abdomen: soft non-tender non-distended, no hepatosplenomegaly  Ext: no clubbing cyanosis or edema  Skin: no rashes and no petechiae  Neuro: alert and oriented X 3, none focal    LABS:                          12.8   4.72  )-----------( 125      ( 13 Dec 2019 06:00 )             39.2         Mean Cell Volume : 96.3 fL  Mean Cell Hemoglobin : 31.4 pg  Mean Cell Hemoglobin Concentration : 32.7 %  Auto Neutrophil # : x  Auto Lymphocyte # : x  Auto Monocyte # : x  Auto Eosinophil # : x  Auto Basophil # : x  Auto Neutrophil % : x  Auto Lymphocyte % : x  Auto Monocyte % : x  Auto Eosinophil % : x  Auto Basophil % : x      Serial CBC's  12-13 @ 06:00  Hct-39.2 / Hgb-12.8 / Plat-125 / RBC-4.07 / WBC-4.72  Serial CBC's  12-12 @ 15:17  Hct-40.8 / Hgb-13.3 / Plat-149 / RBC-4.16 / WBC-5.18      12-13    142  |  107  |  15  ----------------------------<  86  4.5   |  24  |  1.12    Ca    9.5      13 Dec 2019 06:00  Mg     2.0     12-13    TPro  6.9  /  Alb  4.4  /  TBili  0.6  /  DBili  x   /  AST  30  /  ALT  35  /  AlkPhos  110  12-12      PT/INR - ( 12 Dec 2019 15:17 )   PT: 11.7 SEC;   INR: 1.03          PTT - ( 12 Dec 2019 15:17 )  PTT:26.4 SEC        RADIOLOGY & ADDITIONAL STUDIES:    reviewed

## 2019-12-13 NOTE — PHYSICAL THERAPY INITIAL EVALUATION ADULT - GAIT DISTANCE, PT EVAL
3 steps at the bedside, further ambulation deferred due to pt unsteady and pt fearful of falling motioned to return to the bed

## 2019-12-13 NOTE — PHYSICAL THERAPY INITIAL EVALUATION ADULT - PERTINENT HX OF CURRENT PROBLEM, REHAB EVAL
89 y/o male with medical history notable for NSCLC with bone and brain mets s/p radiation on Tagrisso, HTN, BPH, p/w confusion and agitation.

## 2019-12-13 NOTE — PHYSICAL THERAPY INITIAL EVALUATION ADULT - GENERAL OBSERVATIONS, REHAB EVAL
Pt seen sitting on side of bed with RN Obil present -pt wanting to use the bedside commode. Ok for PT evaluation per RN Obil. Pt confused , follows some simple commands, pt difficult to focus at this time as he is mainly concerned with gown he is wearing.

## 2019-12-13 NOTE — CHART NOTE - NSCHARTNOTEFT_GEN_A_CORE
Psychiatry consult requested. Patient with confusion and agitation x 1 month , angry outbursts where previously he was calm. Awaiting recommendations. Psychiatry consult requested. Patient with confusion and agitation x 1 month , angry outbursts where previously he was calm. Awaiting recommendations. Case discussed with attending. No need for Neurology consult at this time.

## 2019-12-14 DIAGNOSIS — Z29.9 ENCOUNTER FOR PROPHYLACTIC MEASURES, UNSPECIFIED: ICD-10-CM

## 2019-12-14 LAB
ANION GAP SERPL CALC-SCNC: 13 MMO/L — SIGNIFICANT CHANGE UP (ref 7–14)
BACTERIA UR CULT: SIGNIFICANT CHANGE UP
BUN SERPL-MCNC: 16 MG/DL — SIGNIFICANT CHANGE UP (ref 7–23)
CALCIUM SERPL-MCNC: 9.3 MG/DL — SIGNIFICANT CHANGE UP (ref 8.4–10.5)
CHLORIDE SERPL-SCNC: 102 MMOL/L — SIGNIFICANT CHANGE UP (ref 98–107)
CO2 SERPL-SCNC: 24 MMOL/L — SIGNIFICANT CHANGE UP (ref 22–31)
CREAT SERPL-MCNC: 1.05 MG/DL — SIGNIFICANT CHANGE UP (ref 0.5–1.3)
GLUCOSE SERPL-MCNC: 89 MG/DL — SIGNIFICANT CHANGE UP (ref 70–99)
HCT VFR BLD CALC: 42.9 % — SIGNIFICANT CHANGE UP (ref 39–50)
HGB BLD-MCNC: 14.1 G/DL — SIGNIFICANT CHANGE UP (ref 13–17)
MCHC RBC-ENTMCNC: 31.5 PG — SIGNIFICANT CHANGE UP (ref 27–34)
MCHC RBC-ENTMCNC: 32.9 % — SIGNIFICANT CHANGE UP (ref 32–36)
MCV RBC AUTO: 96 FL — SIGNIFICANT CHANGE UP (ref 80–100)
NRBC # FLD: 0 K/UL — SIGNIFICANT CHANGE UP (ref 0–0)
PLATELET # BLD AUTO: 120 K/UL — LOW (ref 150–400)
PMV BLD: 10.2 FL — SIGNIFICANT CHANGE UP (ref 7–13)
POTASSIUM SERPL-MCNC: 3.7 MMOL/L — SIGNIFICANT CHANGE UP (ref 3.5–5.3)
POTASSIUM SERPL-SCNC: 3.7 MMOL/L — SIGNIFICANT CHANGE UP (ref 3.5–5.3)
RBC # BLD: 4.47 M/UL — SIGNIFICANT CHANGE UP (ref 4.2–5.8)
RBC # FLD: 13.1 % — SIGNIFICANT CHANGE UP (ref 10.3–14.5)
SODIUM SERPL-SCNC: 139 MMOL/L — SIGNIFICANT CHANGE UP (ref 135–145)
SPECIMEN SOURCE: SIGNIFICANT CHANGE UP
WBC # BLD: 4.39 K/UL — SIGNIFICANT CHANGE UP (ref 3.8–10.5)
WBC # FLD AUTO: 4.39 K/UL — SIGNIFICANT CHANGE UP (ref 3.8–10.5)

## 2019-12-14 PROCEDURE — 99233 SBSQ HOSP IP/OBS HIGH 50: CPT

## 2019-12-14 RX ORDER — LANOLIN ALCOHOL/MO/W.PET/CERES
3 CREAM (GRAM) TOPICAL AT BEDTIME
Refills: 0 | Status: DISCONTINUED | OUTPATIENT
Start: 2019-12-14 | End: 2019-12-17

## 2019-12-14 RX ADMIN — TAMSULOSIN HYDROCHLORIDE 0.4 MILLIGRAM(S): 0.4 CAPSULE ORAL at 23:37

## 2019-12-14 RX ADMIN — LISINOPRIL 20 MILLIGRAM(S): 2.5 TABLET ORAL at 05:54

## 2019-12-14 RX ADMIN — Medication 3 MILLIGRAM(S): at 23:37

## 2019-12-14 RX ADMIN — Medication 81 MILLIGRAM(S): at 11:05

## 2019-12-14 RX ADMIN — FINASTERIDE 5 MILLIGRAM(S): 5 TABLET, FILM COATED ORAL at 11:05

## 2019-12-14 RX ADMIN — AMLODIPINE BESYLATE 2.5 MILLIGRAM(S): 2.5 TABLET ORAL at 05:54

## 2019-12-14 RX ADMIN — OSIMERTINIB 80 MILLIGRAM(S): 80 TABLET, FILM COATED ORAL at 11:05

## 2019-12-14 RX ADMIN — HALOPERIDOL DECANOATE 0.25 MILLIGRAM(S): 100 INJECTION INTRAMUSCULAR at 11:05

## 2019-12-14 RX ADMIN — ATORVASTATIN CALCIUM 40 MILLIGRAM(S): 80 TABLET, FILM COATED ORAL at 23:37

## 2019-12-14 NOTE — PROGRESS NOTE ADULT - PROBLEM SELECTOR PLAN 1
- Suspect progression of the brain metastasis.  - Oncology recommend repeat MRI of Brain. Pending as of today.   - CT Head reviewed.  - Psychiatry consult for behavior disturbance.   - Started Melatonin, Haldol 0.25mg q8h PRN.   - Difficult disposition d/t behavior disturbance at home for the family at this time.   - PT recommending rehab.

## 2019-12-14 NOTE — PROGRESS NOTE ADULT - PROBLEM SELECTOR PLAN 6
1) PCP Contacted on Admission: [ x ] Y     [  ] N     [  ] N/A - [facility] - Dr. Lama  2) Date of Contact with PCP: 12/13  3) PCP Contacted at Discharge:  [  ] Y    [  ] N    [  ] N/A -   4) Summary of Handoff Given to PCP:   5) Post-Discharge Appointment Date and Location:

## 2019-12-14 NOTE — PROGRESS NOTE ADULT - SUBJECTIVE AND OBJECTIVE BOX
PROGRESS NOTE:     Patient is a 89y old  Male who presents with a chief complaint of Confusion, agitation (13 Dec 2019 11:21)    SUBJECTIVE / OVERNIGHT EVENTS:  Patient seen and evaluated at bedside.   Alert, awake, communicative. Denies any current complaints of sob, cp, abdominal pain, n/v, headahce.   Reports feeling "good, I am feeling better"  Tolerating oral intake, no voiding issues, having regular BM.   No other overnight events reported.  Pending MRI.     ADDITIONAL REVIEW OF SYSTEMS:    MEDICATIONS  (STANDING):  amLODIPine   Tablet 2.5 milliGRAM(s) Oral daily  aspirin enteric coated 81 milliGRAM(s) Oral daily  atorvastatin 40 milliGRAM(s) Oral at bedtime  finasteride 5 milliGRAM(s) Oral daily  lisinopril 20 milliGRAM(s) Oral daily  melatonin 3 milliGRAM(s) Oral at bedtime  osimertinib 80 milliGRAM(s) Oral daily  tamsulosin 0.4 milliGRAM(s) Oral at bedtime    MEDICATIONS  (PRN):  haloperidol     Tablet 0.25 milliGRAM(s) Oral every 8 hours PRN Agression  haloperidol    Injectable 0.25 milliGRAM(s) IntraMuscular every 8 hours PRN Aggression    CAPILLARY BLOOD GLUCOSE    I&O's Summary    PHYSICAL EXAM:  Vital Signs Last 24 Hrs  T(C): 36.5 (14 Dec 2019 05:53), Max: 36.7 (13 Dec 2019 20:29)  T(F): 97.7 (14 Dec 2019 05:53), Max: 98 (13 Dec 2019 20:29)  HR: 80 (14 Dec 2019 05:53) (75 - 80)  BP: 144/54 (14 Dec 2019 05:53) (113/56 - 144/54)  BP(mean): --  RR: 18 (14 Dec 2019 05:53) (17 - 18)  SpO2: 100% (14 Dec 2019 05:53) (100% - 100%)    CONSTITUTIONAL: NAD, elderly, frail gentleman, alert, awake, responsive to questioning, communicative.   RESPIRATORY: Normal respiratory effort; lungs are clear to auscultation bilaterally  CARDIOVASCULAR: Regular rate and rhythm, normal S1 and S2,  No lower extremity edema  ABDOMEN: Nontender to palpation, normoactive bowel sounds  MUSCLOSKELETAL: no clubbing or cyanosis of digits; no joint swelling or tenderness to palpation  PSYCH: A+O to person, place, and time; affect appropriate, calm and cooperative.    LABS: reviewed                         14.1   4.39  )-----------( 120      ( 14 Dec 2019 04:10 )             42.9     12-14    139  |  102  |  16  ----------------------------<  89  3.7   |  24  |  1.05    Ca    9.3      14 Dec 2019 04:10  Mg     2.0         Urinalysis Basic - ( 12 Dec 2019 18:00 )    Color: YELLOW / Appearance: CLEAR / S.015 / pH: 6.5  Gluc: NEGATIVE / Ketone: NEGATIVE  / Bili: NEGATIVE / Urobili: NORMAL   Blood: NEGATIVE / Protein: NEGATIVE / Nitrite: NEGATIVE   Leuk Esterase: SMALL / RBC: 6-10 / WBC 6-10   Sq Epi: FEW / Non Sq Epi: x / Bacteria: NEGATIVE    Culture - Urine (collected 12 Dec 2019 19:48)  Source: URINE CATHETER  Final Report (14 Dec 2019 09:47):    Culture grew 3 or more types of organisms which indicate    collection contamination; consider recollection only if    clinically indicated.    RADIOLOGY & ADDITIONAL TESTS:  Results Reviewed:   Imaging Personally Reviewed:  Electrocardiogram Personally Reviewed:    COORDINATION OF CARE:  Care Discussed with Consultants/Other Providers [Y/N]:  Prior or Outpatient Records Reviewed [Y/N]:

## 2019-12-15 LAB
ALBUMIN SERPL ELPH-MCNC: 4.2 G/DL — SIGNIFICANT CHANGE UP (ref 3.3–5)
ALP SERPL-CCNC: 109 U/L — SIGNIFICANT CHANGE UP (ref 40–120)
ALT FLD-CCNC: 22 U/L — SIGNIFICANT CHANGE UP (ref 4–41)
ANION GAP SERPL CALC-SCNC: 12 MMO/L — SIGNIFICANT CHANGE UP (ref 7–14)
AST SERPL-CCNC: 23 U/L — SIGNIFICANT CHANGE UP (ref 4–40)
BASOPHILS # BLD AUTO: 0.03 K/UL — SIGNIFICANT CHANGE UP (ref 0–0.2)
BASOPHILS NFR BLD AUTO: 0.5 % — SIGNIFICANT CHANGE UP (ref 0–2)
BILIRUB SERPL-MCNC: 0.7 MG/DL — SIGNIFICANT CHANGE UP (ref 0.2–1.2)
BUN SERPL-MCNC: 16 MG/DL — SIGNIFICANT CHANGE UP (ref 7–23)
CALCIUM SERPL-MCNC: 9.9 MG/DL — SIGNIFICANT CHANGE UP (ref 8.4–10.5)
CHLORIDE SERPL-SCNC: 104 MMOL/L — SIGNIFICANT CHANGE UP (ref 98–107)
CO2 SERPL-SCNC: 26 MMOL/L — SIGNIFICANT CHANGE UP (ref 22–31)
CREAT SERPL-MCNC: 0.96 MG/DL — SIGNIFICANT CHANGE UP (ref 0.5–1.3)
EOSINOPHIL # BLD AUTO: 0.14 K/UL — SIGNIFICANT CHANGE UP (ref 0–0.5)
EOSINOPHIL NFR BLD AUTO: 2.5 % — SIGNIFICANT CHANGE UP (ref 0–6)
FOLATE SERPL-MCNC: > 20 NG/ML — HIGH (ref 4.7–20)
GLUCOSE SERPL-MCNC: 93 MG/DL — SIGNIFICANT CHANGE UP (ref 70–99)
HCT VFR BLD CALC: 42.2 % — SIGNIFICANT CHANGE UP (ref 39–50)
HGB BLD-MCNC: 14.1 G/DL — SIGNIFICANT CHANGE UP (ref 13–17)
IMM GRANULOCYTES NFR BLD AUTO: 0.4 % — SIGNIFICANT CHANGE UP (ref 0–1.5)
LYMPHOCYTES # BLD AUTO: 1.55 K/UL — SIGNIFICANT CHANGE UP (ref 1–3.3)
LYMPHOCYTES # BLD AUTO: 27.8 % — SIGNIFICANT CHANGE UP (ref 13–44)
MAGNESIUM SERPL-MCNC: 2.1 MG/DL — SIGNIFICANT CHANGE UP (ref 1.6–2.6)
MCHC RBC-ENTMCNC: 31.9 PG — SIGNIFICANT CHANGE UP (ref 27–34)
MCHC RBC-ENTMCNC: 33.4 % — SIGNIFICANT CHANGE UP (ref 32–36)
MCV RBC AUTO: 95.5 FL — SIGNIFICANT CHANGE UP (ref 80–100)
MONOCYTES # BLD AUTO: 0.73 K/UL — SIGNIFICANT CHANGE UP (ref 0–0.9)
MONOCYTES NFR BLD AUTO: 13.1 % — SIGNIFICANT CHANGE UP (ref 2–14)
NEUTROPHILS # BLD AUTO: 3.1 K/UL — SIGNIFICANT CHANGE UP (ref 1.8–7.4)
NEUTROPHILS NFR BLD AUTO: 55.7 % — SIGNIFICANT CHANGE UP (ref 43–77)
NRBC # FLD: 0 K/UL — SIGNIFICANT CHANGE UP (ref 0–0)
PHOSPHATE SERPL-MCNC: 3.4 MG/DL — SIGNIFICANT CHANGE UP (ref 2.5–4.5)
PLATELET # BLD AUTO: 126 K/UL — LOW (ref 150–400)
PMV BLD: 10.4 FL — SIGNIFICANT CHANGE UP (ref 7–13)
POTASSIUM SERPL-MCNC: 4.9 MMOL/L — SIGNIFICANT CHANGE UP (ref 3.5–5.3)
POTASSIUM SERPL-SCNC: 4.9 MMOL/L — SIGNIFICANT CHANGE UP (ref 3.5–5.3)
PROT SERPL-MCNC: 7.1 G/DL — SIGNIFICANT CHANGE UP (ref 6–8.3)
RBC # BLD: 4.42 M/UL — SIGNIFICANT CHANGE UP (ref 4.2–5.8)
RBC # FLD: 12.9 % — SIGNIFICANT CHANGE UP (ref 10.3–14.5)
SODIUM SERPL-SCNC: 142 MMOL/L — SIGNIFICANT CHANGE UP (ref 135–145)
VIT B12 SERPL-MCNC: 455 PG/ML — SIGNIFICANT CHANGE UP (ref 200–900)
WBC # BLD: 5.57 K/UL — SIGNIFICANT CHANGE UP (ref 3.8–10.5)
WBC # FLD AUTO: 5.57 K/UL — SIGNIFICANT CHANGE UP (ref 3.8–10.5)

## 2019-12-15 PROCEDURE — 99233 SBSQ HOSP IP/OBS HIGH 50: CPT

## 2019-12-15 RX ADMIN — TAMSULOSIN HYDROCHLORIDE 0.4 MILLIGRAM(S): 0.4 CAPSULE ORAL at 21:03

## 2019-12-15 RX ADMIN — Medication 81 MILLIGRAM(S): at 16:42

## 2019-12-15 RX ADMIN — ATORVASTATIN CALCIUM 40 MILLIGRAM(S): 80 TABLET, FILM COATED ORAL at 21:03

## 2019-12-15 RX ADMIN — AMLODIPINE BESYLATE 2.5 MILLIGRAM(S): 2.5 TABLET ORAL at 06:55

## 2019-12-15 RX ADMIN — LISINOPRIL 20 MILLIGRAM(S): 2.5 TABLET ORAL at 06:55

## 2019-12-15 RX ADMIN — OSIMERTINIB 80 MILLIGRAM(S): 80 TABLET, FILM COATED ORAL at 16:43

## 2019-12-15 RX ADMIN — FINASTERIDE 5 MILLIGRAM(S): 5 TABLET, FILM COATED ORAL at 16:43

## 2019-12-15 RX ADMIN — Medication 3 MILLIGRAM(S): at 21:03

## 2019-12-15 NOTE — PROGRESS NOTE ADULT - SUBJECTIVE AND OBJECTIVE BOX
PROGRESS NOTE:     Patient is a 89y old  Male who presents with a chief complaint of Confusion, agitation (13 Dec 2019 11:21)    SUBJECTIVE / OVERNIGHT EVENTS:  Patient seen and evaluated at bedside.   Alert, awake, communicative. Denies any current complaints of sob, cp, abdominal pain, n/v, headahce.   Reports feeling "fine", tolerating oral intake, no voiding issues, having regular BM.   No other overnight events reported.   Pending MRI- attempted to expedite MRI again today.     ADDITIONAL REVIEW OF SYSTEMS:    MEDICATIONS  (STANDING):  amLODIPine   Tablet 2.5 milliGRAM(s) Oral daily  aspirin enteric coated 81 milliGRAM(s) Oral daily  atorvastatin 40 milliGRAM(s) Oral at bedtime  finasteride 5 milliGRAM(s) Oral daily  lisinopril 20 milliGRAM(s) Oral daily  melatonin 3 milliGRAM(s) Oral at bedtime  osimertinib 80 milliGRAM(s) Oral daily  tamsulosin 0.4 milliGRAM(s) Oral at bedtime    MEDICATIONS  (PRN):  haloperidol     Tablet 0.25 milliGRAM(s) Oral every 8 hours PRN Agression  haloperidol    Injectable 0.25 milliGRAM(s) IntraMuscular every 8 hours PRN Aggression    CAPILLARY BLOOD GLUCOSE    I&O's Summary    PHYSICAL EXAM:  Vital Signs Last 24 Hrs  T(C): 36.8 (15 Dec 2019 13:28), Max: 36.8 (15 Dec 2019 13:28)  T(F): 98.2 (15 Dec 2019 13:28), Max: 98.2 (15 Dec 2019 13:28)  HR: 84 (15 Dec 2019 13:28) (80 - 86)  BP: 128/60 (15 Dec 2019 13:28) (123/50 - 140/55)  BP(mean): --  RR: 16 (15 Dec 2019 13:28) (16 - 18)  SpO2: 98% (15 Dec 2019 13:28) (98% - 100%)    CONSTITUTIONAL: NAD, elderly, frail gentleman, alert, awake, responsive to questioning, communicative.   RESPIRATORY: Normal respiratory effort; lungs are clear to auscultation bilaterally  CARDIOVASCULAR: Regular rate and rhythm, normal S1 and S2,  No lower extremity edema  ABDOMEN: Nontender to palpation, normoactive bowel sounds  MUSCLOSKELETAL: no clubbing or cyanosis of digits; no joint swelling or tenderness to palpation  PSYCH: A+O to person, place, and time; affect appropriate, calm and cooperative.    LABS: reviewed                         14.1   4.39  )-----------( 120      ( 14 Dec 2019 04:10 )             42.9     12-    139  |  102  |  16  ----------------------------<  89  3.7   |  24  |  1.05    Ca    9.3      14 Dec 2019 04:10  Mg     2.0         Urinalysis Basic - ( 12 Dec 2019 18:00 )    Color: YELLOW / Appearance: CLEAR / S.015 / pH: 6.5  Gluc: NEGATIVE / Ketone: NEGATIVE  / Bili: NEGATIVE / Urobili: NORMAL   Blood: NEGATIVE / Protein: NEGATIVE / Nitrite: NEGATIVE   Leuk Esterase: SMALL / RBC: 6-10 / WBC 6-10   Sq Epi: FEW / Non Sq Epi: x / Bacteria: NEGATIVE    Culture - Urine (collected 12 Dec 2019 19:48)  Source: URINE CATHETER  Final Report (14 Dec 2019 09:47):    Culture grew 3 or more types of organisms which indicate    collection contamination; consider recollection only if    clinically indicated.    RADIOLOGY & ADDITIONAL TESTS:  Results Reviewed:   Imaging Personally Reviewed:  Electrocardiogram Personally Reviewed:    COORDINATION OF CARE:  Care Discussed with Consultants/Other Providers [Y/N]:  Prior or Outpatient Records Reviewed [Y/N]:

## 2019-12-15 NOTE — PROGRESS NOTE ADULT - PROBLEM SELECTOR PLAN 1
- Suspect progression of the brain metastasis.  - Oncology recommend repeat MRI of Brain. Pending - attempted to expedite.  - CT Head reviewed.  - Psychiatry consult for behavior disturbance.   - Started Melatonin, Haldol 0.25mg q8h PRN.   - Difficult disposition d/t behavior disturbance at home for the family at this time.   - PT recommending rehab.

## 2019-12-15 NOTE — PROGRESS NOTE ADULT - PROBLEM SELECTOR PLAN 6
1) PCP Contacted on Admission: [ x ] Y     [  ] N     [  ] N/A - [facility] - Dr. Lama  2) Date of Contact with PCP: 12/13  3) PCP Contacted at Discharge:  [  ] Y    [  ] N    [  ] N/A -   4) Summary of Handoff Given to PCP:   5) Post-Discharge Appointment Date and Location:

## 2019-12-16 LAB
ALBUMIN SERPL ELPH-MCNC: 4 G/DL — SIGNIFICANT CHANGE UP (ref 3.3–5)
ALP SERPL-CCNC: 105 U/L — SIGNIFICANT CHANGE UP (ref 40–120)
ALT FLD-CCNC: 26 U/L — SIGNIFICANT CHANGE UP (ref 4–41)
ANION GAP SERPL CALC-SCNC: 14 MMO/L — SIGNIFICANT CHANGE UP (ref 7–14)
AST SERPL-CCNC: 27 U/L — SIGNIFICANT CHANGE UP (ref 4–40)
BASOPHILS # BLD AUTO: 0.03 K/UL — SIGNIFICANT CHANGE UP (ref 0–0.2)
BASOPHILS NFR BLD AUTO: 0.6 % — SIGNIFICANT CHANGE UP (ref 0–2)
BILIRUB SERPL-MCNC: 0.6 MG/DL — SIGNIFICANT CHANGE UP (ref 0.2–1.2)
BUN SERPL-MCNC: 21 MG/DL — SIGNIFICANT CHANGE UP (ref 7–23)
CALCIUM SERPL-MCNC: 9.4 MG/DL — SIGNIFICANT CHANGE UP (ref 8.4–10.5)
CHLORIDE SERPL-SCNC: 103 MMOL/L — SIGNIFICANT CHANGE UP (ref 98–107)
CO2 SERPL-SCNC: 22 MMOL/L — SIGNIFICANT CHANGE UP (ref 22–31)
CREAT SERPL-MCNC: 1.05 MG/DL — SIGNIFICANT CHANGE UP (ref 0.5–1.3)
EOSINOPHIL # BLD AUTO: 0.16 K/UL — SIGNIFICANT CHANGE UP (ref 0–0.5)
EOSINOPHIL NFR BLD AUTO: 2.9 % — SIGNIFICANT CHANGE UP (ref 0–6)
GLUCOSE SERPL-MCNC: 93 MG/DL — SIGNIFICANT CHANGE UP (ref 70–99)
HCT VFR BLD CALC: 42 % — SIGNIFICANT CHANGE UP (ref 39–50)
HGB BLD-MCNC: 13.6 G/DL — SIGNIFICANT CHANGE UP (ref 13–17)
IMM GRANULOCYTES NFR BLD AUTO: 0.2 % — SIGNIFICANT CHANGE UP (ref 0–1.5)
LYMPHOCYTES # BLD AUTO: 1.74 K/UL — SIGNIFICANT CHANGE UP (ref 1–3.3)
LYMPHOCYTES # BLD AUTO: 32 % — SIGNIFICANT CHANGE UP (ref 13–44)
MAGNESIUM SERPL-MCNC: 2 MG/DL — SIGNIFICANT CHANGE UP (ref 1.6–2.6)
MCHC RBC-ENTMCNC: 31.6 PG — SIGNIFICANT CHANGE UP (ref 27–34)
MCHC RBC-ENTMCNC: 32.4 % — SIGNIFICANT CHANGE UP (ref 32–36)
MCV RBC AUTO: 97.4 FL — SIGNIFICANT CHANGE UP (ref 80–100)
MONOCYTES # BLD AUTO: 0.67 K/UL — SIGNIFICANT CHANGE UP (ref 0–0.9)
MONOCYTES NFR BLD AUTO: 12.3 % — SIGNIFICANT CHANGE UP (ref 2–14)
NEUTROPHILS # BLD AUTO: 2.82 K/UL — SIGNIFICANT CHANGE UP (ref 1.8–7.4)
NEUTROPHILS NFR BLD AUTO: 52 % — SIGNIFICANT CHANGE UP (ref 43–77)
NRBC # FLD: 0 K/UL — SIGNIFICANT CHANGE UP (ref 0–0)
PHOSPHATE SERPL-MCNC: 3.4 MG/DL — SIGNIFICANT CHANGE UP (ref 2.5–4.5)
PLATELET # BLD AUTO: 138 K/UL — LOW (ref 150–400)
PMV BLD: 9.6 FL — SIGNIFICANT CHANGE UP (ref 7–13)
POTASSIUM SERPL-MCNC: 4.7 MMOL/L — SIGNIFICANT CHANGE UP (ref 3.5–5.3)
POTASSIUM SERPL-SCNC: 4.7 MMOL/L — SIGNIFICANT CHANGE UP (ref 3.5–5.3)
PROT SERPL-MCNC: 6.6 G/DL — SIGNIFICANT CHANGE UP (ref 6–8.3)
RBC # BLD: 4.31 M/UL — SIGNIFICANT CHANGE UP (ref 4.2–5.8)
RBC # FLD: 13.1 % — SIGNIFICANT CHANGE UP (ref 10.3–14.5)
SODIUM SERPL-SCNC: 139 MMOL/L — SIGNIFICANT CHANGE UP (ref 135–145)
T PALLIDUM AB TITR SER: NEGATIVE — SIGNIFICANT CHANGE UP
WBC # BLD: 5.43 K/UL — SIGNIFICANT CHANGE UP (ref 3.8–10.5)
WBC # FLD AUTO: 5.43 K/UL — SIGNIFICANT CHANGE UP (ref 3.8–10.5)

## 2019-12-16 PROCEDURE — 99233 SBSQ HOSP IP/OBS HIGH 50: CPT

## 2019-12-16 RX ADMIN — Medication 81 MILLIGRAM(S): at 16:22

## 2019-12-16 RX ADMIN — ATORVASTATIN CALCIUM 40 MILLIGRAM(S): 80 TABLET, FILM COATED ORAL at 21:04

## 2019-12-16 RX ADMIN — FINASTERIDE 5 MILLIGRAM(S): 5 TABLET, FILM COATED ORAL at 16:21

## 2019-12-16 RX ADMIN — OSIMERTINIB 80 MILLIGRAM(S): 80 TABLET, FILM COATED ORAL at 16:21

## 2019-12-16 RX ADMIN — AMLODIPINE BESYLATE 2.5 MILLIGRAM(S): 2.5 TABLET ORAL at 05:01

## 2019-12-16 RX ADMIN — LISINOPRIL 20 MILLIGRAM(S): 2.5 TABLET ORAL at 05:01

## 2019-12-16 RX ADMIN — Medication 3 MILLIGRAM(S): at 21:04

## 2019-12-16 RX ADMIN — TAMSULOSIN HYDROCHLORIDE 0.4 MILLIGRAM(S): 0.4 CAPSULE ORAL at 21:04

## 2019-12-16 NOTE — PROGRESS NOTE ADULT - PROBLEM SELECTOR PLAN 6
1) PCP Contacted on Admission: [ x ] Y     [  ] N     [  ] N/A - [facility] - Dr. Lama  2) Date of Contact with PCP: 12/13  3) PCP Contacted at Discharge:  [  ] Y    [  ] N    [  ] N/A -   4) Summary of Handoff Given to PCP:   5) Post-Discharge Appointment Date and Location:

## 2019-12-16 NOTE — PROGRESS NOTE BEHAVIORAL HEALTH - NSBHCHARTREVIEWVS_PSY_A_CORE FT
Vital Signs Last 24 Hrs  T(C): 37 (16 Dec 2019 11:46), Max: 37 (16 Dec 2019 11:46)  T(F): 98.6 (16 Dec 2019 11:46), Max: 98.6 (16 Dec 2019 11:46)  HR: 85 (16 Dec 2019 11:46) (81 - 85)  BP: 100/50 (16 Dec 2019 11:46) (100/50 - 128/60)  BP(mean): --  RR: 16 (16 Dec 2019 11:46) (16 - 18)  SpO2: 98% (16 Dec 2019 11:46) (98% - 100%)

## 2019-12-16 NOTE — PROGRESS NOTE BEHAVIORAL HEALTH - NSBHFUPINTERVALHXFT_PSY_A_CORE
met with the patient this morning. Care coordinated with dr maria. Patient is confused, disoriented, with notable confabulation, but pleasant, no distress and engages well with md. Denies any mood symptoms, denies any si or hi, denies any psychosis.  Checked UE- tremors, and mild cogwheeling

## 2019-12-16 NOTE — PROGRESS NOTE ADULT - SUBJECTIVE AND OBJECTIVE BOX
[facility] Division of Hospital Medicine  Rehana Mccartney MD  Pager (M-F, 8A-5P):  In-house pager [phone]; Long-range pager [phone]  Other Times:  Please page Hospitalist in Charge -  In-house pager [phone]    Patient is a 89y old  Male who presents with a chief complaint of Confusion, agitation (15 Dec 2019 18:47)      SUBJECTIVE / OVERNIGHT EVENTS: No problems reported over night. Pt seen earlier, sitting up in chair.  Says little over whelmed looking at list of rehabs.  Denies pain.  Seems confused.  ADDITIONAL REVIEW OF SYSTEMS:    MEDICATIONS  (STANDING):  amLODIPine   Tablet 2.5 milliGRAM(s) Oral daily  aspirin enteric coated 81 milliGRAM(s) Oral daily  atorvastatin 40 milliGRAM(s) Oral at bedtime  finasteride 5 milliGRAM(s) Oral daily  lisinopril 20 milliGRAM(s) Oral daily  melatonin 3 milliGRAM(s) Oral at bedtime  osimertinib 80 milliGRAM(s) Oral daily  tamsulosin 0.4 milliGRAM(s) Oral at bedtime    MEDICATIONS  (PRN):  haloperidol     Tablet 0.25 milliGRAM(s) Oral every 8 hours PRN Agression  haloperidol    Injectable 0.25 milliGRAM(s) IntraMuscular every 8 hours PRN Aggression      CAPILLARY BLOOD GLUCOSE        I&O's Summary      PHYSICAL EXAM:  Vital Signs Last 24 Hrs  T(C): 37 (16 Dec 2019 11:46), Max: 37 (16 Dec 2019 11:46)  T(F): 98.6 (16 Dec 2019 11:46), Max: 98.6 (16 Dec 2019 11:46)  HR: 85 (16 Dec 2019 11:46) (81 - 85)  BP: 100/50 (16 Dec 2019 11:46) (100/50 - 126/65)  BP(mean): --  RR: 16 (16 Dec 2019 11:46) (16 - 18)  SpO2: 98% (16 Dec 2019 11:46) (98% - 100%)    CONSTITUTIONAL: NAD, elderly, frail gentleman, alert, awake, responsive to questioning, communicative, sitting up in chair  RESPIRATORY: Normal respiratory effort; lungs are clear to auscultation bilaterally  CARDIOVASCULAR: Regular rate and rhythm, normal S1 and S2,  No lower extremity edema  ABDOMEN: Nontender to palpation, normoactive bowel sounds  MUSCLOSKELETAL: no clubbing or cyanosis of digits; no joint swelling or tenderness to palpation  PSYCH: calm, oriented to self, "[facility], February"    LABS:                        13.6   5.43  )-----------( 138      ( 16 Dec 2019 04:35 )             42.0     12-16    139  |  103  |  21  ----------------------------<  93  4.7   |  22  |  1.05    Ca    9.4      16 Dec 2019 04:35  Phos  3.4     12-16  Mg     2.0     12-16    TPro  6.6  /  Alb  4.0  /  TBili  0.6  /  DBili  x   /  AST  27  /  ALT  26  /  AlkPhos  105  12-16                RADIOLOGY & ADDITIONAL TESTS:  Results Reviewed:   Imaging Personally Reviewed:  Electrocardiogram Personally Reviewed:    COORDINATION OF CARE:  Care Discussed with Consultants/Other Providers [Y/N]: RN, SW, CM, ACP re overall care   Prior or Outpatient Records Reviewed [Y/N]:

## 2019-12-16 NOTE — PROGRESS NOTE BEHAVIORAL HEALTH - NSBHCHARTREVIEWLAB_PSY_A_CORE FT
CBC Full  -  ( 16 Dec 2019 04:35 )  WBC Count : 5.43 K/uL  RBC Count : 4.31 M/uL  Hemoglobin : 13.6 g/dL  Hematocrit : 42.0 %  Platelet Count - Automated : 138 K/uL  Mean Cell Volume : 97.4 fL  Mean Cell Hemoglobin : 31.6 pg  Mean Cell Hemoglobin Concentration : 32.4 %  Auto Neutrophil # : 2.82 K/uL  Auto Lymphocyte # : 1.74 K/uL  Auto Monocyte # : 0.67 K/uL  Auto Eosinophil # : 0.16 K/uL  Auto Basophil # : 0.03 K/uL  Auto Neutrophil % : 52.0 %  Auto Lymphocyte % : 32.0 %  Auto Monocyte % : 12.3 %  Auto Eosinophil % : 2.9 %  Auto Basophil % : 0.6 %  12-16    139  |  103  |  21  ----------------------------<  93  4.7   |  22  |  1.05    Ca    9.4      16 Dec 2019 04:35  Phos  3.4     12-16  Mg     2.0     12-16    TPro  6.6  /  Alb  4.0  /  TBili  0.6  /  DBili  x   /  AST  27  /  ALT  26  /  AlkPhos  105  12-16

## 2019-12-17 ENCOUNTER — TRANSCRIPTION ENCOUNTER (OUTPATIENT)
Age: 84
End: 2019-12-17

## 2019-12-17 LAB
ANION GAP SERPL CALC-SCNC: 13 MMO/L — SIGNIFICANT CHANGE UP (ref 7–14)
BASOPHILS # BLD AUTO: 0.03 K/UL — SIGNIFICANT CHANGE UP (ref 0–0.2)
BASOPHILS NFR BLD AUTO: 0.5 % — SIGNIFICANT CHANGE UP (ref 0–2)
BUN SERPL-MCNC: 23 MG/DL — SIGNIFICANT CHANGE UP (ref 7–23)
CALCIUM SERPL-MCNC: 9.3 MG/DL — SIGNIFICANT CHANGE UP (ref 8.4–10.5)
CHLORIDE SERPL-SCNC: 105 MMOL/L — SIGNIFICANT CHANGE UP (ref 98–107)
CO2 SERPL-SCNC: 23 MMOL/L — SIGNIFICANT CHANGE UP (ref 22–31)
CREAT SERPL-MCNC: 1.11 MG/DL — SIGNIFICANT CHANGE UP (ref 0.5–1.3)
EOSINOPHIL # BLD AUTO: 0.13 K/UL — SIGNIFICANT CHANGE UP (ref 0–0.5)
EOSINOPHIL NFR BLD AUTO: 2.3 % — SIGNIFICANT CHANGE UP (ref 0–6)
GLUCOSE SERPL-MCNC: 93 MG/DL — SIGNIFICANT CHANGE UP (ref 70–99)
HCT VFR BLD CALC: 39.7 % — SIGNIFICANT CHANGE UP (ref 39–50)
HGB BLD-MCNC: 13.1 G/DL — SIGNIFICANT CHANGE UP (ref 13–17)
IMM GRANULOCYTES NFR BLD AUTO: 0.4 % — SIGNIFICANT CHANGE UP (ref 0–1.5)
LYMPHOCYTES # BLD AUTO: 1.79 K/UL — SIGNIFICANT CHANGE UP (ref 1–3.3)
LYMPHOCYTES # BLD AUTO: 32.2 % — SIGNIFICANT CHANGE UP (ref 13–44)
MAGNESIUM SERPL-MCNC: 1.9 MG/DL — SIGNIFICANT CHANGE UP (ref 1.6–2.6)
MCHC RBC-ENTMCNC: 31.8 PG — SIGNIFICANT CHANGE UP (ref 27–34)
MCHC RBC-ENTMCNC: 33 % — SIGNIFICANT CHANGE UP (ref 32–36)
MCV RBC AUTO: 96.4 FL — SIGNIFICANT CHANGE UP (ref 80–100)
MONOCYTES # BLD AUTO: 0.67 K/UL — SIGNIFICANT CHANGE UP (ref 0–0.9)
MONOCYTES NFR BLD AUTO: 12.1 % — SIGNIFICANT CHANGE UP (ref 2–14)
NEUTROPHILS # BLD AUTO: 2.92 K/UL — SIGNIFICANT CHANGE UP (ref 1.8–7.4)
NEUTROPHILS NFR BLD AUTO: 52.5 % — SIGNIFICANT CHANGE UP (ref 43–77)
NRBC # FLD: 0 K/UL — SIGNIFICANT CHANGE UP (ref 0–0)
PHOSPHATE SERPL-MCNC: 3.3 MG/DL — SIGNIFICANT CHANGE UP (ref 2.5–4.5)
PLATELET # BLD AUTO: 144 K/UL — LOW (ref 150–400)
PMV BLD: 9.9 FL — SIGNIFICANT CHANGE UP (ref 7–13)
POTASSIUM SERPL-MCNC: 4 MMOL/L — SIGNIFICANT CHANGE UP (ref 3.5–5.3)
POTASSIUM SERPL-SCNC: 4 MMOL/L — SIGNIFICANT CHANGE UP (ref 3.5–5.3)
RBC # BLD: 4.12 M/UL — LOW (ref 4.2–5.8)
RBC # FLD: 13.2 % — SIGNIFICANT CHANGE UP (ref 10.3–14.5)
SODIUM SERPL-SCNC: 141 MMOL/L — SIGNIFICANT CHANGE UP (ref 135–145)
WBC # BLD: 5.56 K/UL — SIGNIFICANT CHANGE UP (ref 3.8–10.5)
WBC # FLD AUTO: 5.56 K/UL — SIGNIFICANT CHANGE UP (ref 3.8–10.5)

## 2019-12-17 PROCEDURE — 99233 SBSQ HOSP IP/OBS HIGH 50: CPT

## 2019-12-17 PROCEDURE — 70553 MRI BRAIN STEM W/O & W/DYE: CPT | Mod: 26

## 2019-12-17 RX ORDER — TRAZODONE HCL 50 MG
25 TABLET ORAL
Refills: 0 | Status: DISCONTINUED | OUTPATIENT
Start: 2019-12-17 | End: 2019-12-20

## 2019-12-17 RX ADMIN — LISINOPRIL 20 MILLIGRAM(S): 2.5 TABLET ORAL at 05:21

## 2019-12-17 RX ADMIN — ATORVASTATIN CALCIUM 40 MILLIGRAM(S): 80 TABLET, FILM COATED ORAL at 21:06

## 2019-12-17 RX ADMIN — Medication 25 MILLIGRAM(S): at 21:05

## 2019-12-17 RX ADMIN — FINASTERIDE 5 MILLIGRAM(S): 5 TABLET, FILM COATED ORAL at 12:10

## 2019-12-17 RX ADMIN — TAMSULOSIN HYDROCHLORIDE 0.4 MILLIGRAM(S): 0.4 CAPSULE ORAL at 21:06

## 2019-12-17 RX ADMIN — OSIMERTINIB 80 MILLIGRAM(S): 80 TABLET, FILM COATED ORAL at 12:09

## 2019-12-17 RX ADMIN — HALOPERIDOL DECANOATE 0.25 MILLIGRAM(S): 100 INJECTION INTRAMUSCULAR at 00:29

## 2019-12-17 RX ADMIN — AMLODIPINE BESYLATE 2.5 MILLIGRAM(S): 2.5 TABLET ORAL at 05:21

## 2019-12-17 RX ADMIN — Medication 81 MILLIGRAM(S): at 12:09

## 2019-12-17 NOTE — PROGRESS NOTE ADULT - PROBLEM SELECTOR PLAN 1
- Suspect progression of the brain metastasis.  - Oncology recommend repeat MRI of Brain. Pending - attempted to expedite.  - CT Head reviewed.  - Psychiatry consult for behavior disturbance.   - Started Melatonin, Haldol 0.25mg q8h PRN.   - Difficult disposition d/t behavior disturbance at home for the family at this time.   - PT recommending rehab. reviewed MRI with radiology, disease seems stable  - Psychiatry consult for behavior disturbance. Started Melatonin, Haldol 0.25mg q8h PRN.  D/w psych start trazodone 25 hs tonight, f/u

## 2019-12-17 NOTE — PROGRESS NOTE BEHAVIORAL HEALTH - NSBHFUPINTERVALHXFT_PSY_A_CORE
Remains confused, disoriented, seems more lethargic this morning. Intentional tremors remain at baseline from prior assessments. No distress.   Care coordinated with son in law Cole over the phone. Discussed black box warning of use of antipsychotics in patient's with dementia- son in law in agreement. Unsure how feasible it is for patient to see outpatient psychiatric providers.   Care coordinated with Dr Mccartney

## 2019-12-17 NOTE — DISCHARGE NOTE PROVIDER - CARE PROVIDERS DIRECT ADDRESSES
,DirectAddress_Unknown ,DirectAddress_Unknown,DirectAddress_Unknown ,DirectAddress_Unknown,DirectAddress_Unknown,[email or web address]

## 2019-12-17 NOTE — PROGRESS NOTE ADULT - PROBLEM SELECTOR PLAN 2
- Appreciate Oncology input.   - Continue Tagrisso. - Appreciate Oncology input. outpt onc f/u  - Continue Tagrisso.  d/w Dr Lama

## 2019-12-17 NOTE — DISCHARGE NOTE PROVIDER - PROVIDER TOKENS
FREE:[LAST:[kris],FIRST:[stephanie],PHONE:[(   )    -],FAX:[(   )    -],FOLLOWUP:[1 week],ESTABLISHEDPATIENT:[T]] FREE:[LAST:[kris],FIRST:[stephanie],PHONE:[(   )    -],FAX:[(   )    -],FOLLOWUP:[1 week],ESTABLISHEDPATIENT:[T]],FREE:[LAST:[hospice care],PHONE:[phone]:[(   )    -]] FREE:[LAST:[ponce],FIRST:[stephanie],PHONE:[(   )    -],FAX:[(   )    -],FOLLOWUP:[1 week],ESTABLISHEDPATIENT:[T]],FREE:[LAST:[hospice care],PHONE:[phone]:[(   )    -],ESTABLISHEDPATIENT:[T]],PROVIDER:[TOKEN:[[id number]:[id number]]]

## 2019-12-17 NOTE — DISCHARGE NOTE NURSING/CASE MANAGEMENT/SOCIAL WORK - PATIENT PORTAL LINK FT
You can access the FollowMyHealth Patient Portal offered by [facility] by registering at the following website: [email or web address]. By joining [facility]’s FollowMyHealth portal, you will also be able to view your health information using other applications (apps) compatible with our system.

## 2019-12-17 NOTE — PROGRESS NOTE ADULT - SUBJECTIVE AND OBJECTIVE BOX
[facility] Division of Hospital Medicine  Rehana Mccartney MD  Pager (M-F, 8A-5P):  In-house pager [phone]; Long-range pager [phone]  Other Times:  Please page Hospitalist in Charge -  In-house pager [phone]    Patient is a 89y old  Male who presents with a chief complaint of Confusion, agitation (17 Dec 2019 14:13)      SUBJECTIVE / OVERNIGHT EVENTS: Got PRN haldol last night.  Confused but calm.  ADDITIONAL REVIEW OF SYSTEMS:    MEDICATIONS  (STANDING):  amLODIPine   Tablet 2.5 milliGRAM(s) Oral daily  aspirin enteric coated 81 milliGRAM(s) Oral daily  atorvastatin 40 milliGRAM(s) Oral at bedtime  finasteride 5 milliGRAM(s) Oral daily  lisinopril 20 milliGRAM(s) Oral daily  melatonin 3 milliGRAM(s) Oral at bedtime  osimertinib 80 milliGRAM(s) Oral daily  tamsulosin 0.4 milliGRAM(s) Oral at bedtime    MEDICATIONS  (PRN):  haloperidol     Tablet 0.25 milliGRAM(s) Oral every 8 hours PRN Agression  haloperidol    Injectable 0.25 milliGRAM(s) IntraMuscular every 8 hours PRN Aggression      CAPILLARY BLOOD GLUCOSE        I&O's Summary      PHYSICAL EXAM:  Vital Signs Last 24 Hrs  T(C): 36.7 (17 Dec 2019 12:05), Max: 37 (16 Dec 2019 20:29)  T(F): 98 (17 Dec 2019 12:05), Max: 98.6 (16 Dec 2019 20:29)  HR: 85 (17 Dec 2019 12:05) (85 - 89)  BP: 109/62 (17 Dec 2019 12:05) (99/54 - 120/62)  BP(mean): --  RR: 17 (17 Dec 2019 12:05) (17 - 17)  SpO2: 97% (17 Dec 2019 12:05) (97% - 100%)    CONSTITUTIONAL: NAD, elderly, frail gentleman, alert, awake, responsive to questioning, communicative, sitting up in chair  RESPIRATORY: Normal respiratory effort; lungs are clear to auscultation bilaterally  CARDIOVASCULAR: Regular rate and rhythm, normal S1 and S2,  No lower extremity edema  ABDOMEN: Nontender to palpation, normoactive bowel sounds  MUSCLOSKELETAL: no clubbing or cyanosis of digits; no joint swelling or tenderness to palpation  PSYCH: calm, oriented to self, "[facility], February"    LABS:                        13.1   5.56  )-----------( 144      ( 17 Dec 2019 05:30 )             39.7     12-17    141  |  105  |  23  ----------------------------<  93  4.0   |  23  |  1.11    Ca    9.3      17 Dec 2019 05:30  Phos  3.3     12-17  Mg     1.9     12-17    TPro  6.6  /  Alb  4.0  /  TBili  0.6  /  DBili  x   /  AST  27  /  ALT  26  /  AlkPhos  105  12-16                RADIOLOGY & ADDITIONAL TESTS:  Results Reviewed:   Imaging Personally Reviewed:  Electrocardiogram Personally Reviewed:    COORDINATION OF CARE:  Care Discussed with Consultants/Other Providers [Y/N]: RN, SW, CM, ACP, psych re overall care   Prior or Outpatient Records Reviewed [Y/N]: [facility] Division of Hospital Medicine  Rehana Mccartney MD  Pager (M-F, 8A-5P):  In-house pager [phone]; Long-range pager [phone]  Other Times:  Please page Hospitalist in Charge -  In-house pager [phone]    Patient is a 89y old  Male who presents with a chief complaint of Confusion, agitation (17 Dec 2019 14:13)      SUBJECTIVE / OVERNIGHT EVENTS: Got PRN haldol last night.  Confused but calm.  Seen with family at bedside, wife and son-in-law concerned that he gets agitated at night at home recently.  ADDITIONAL REVIEW OF SYSTEMS:    MEDICATIONS  (STANDING):  amLODIPine   Tablet 2.5 milliGRAM(s) Oral daily  aspirin enteric coated 81 milliGRAM(s) Oral daily  atorvastatin 40 milliGRAM(s) Oral at bedtime  finasteride 5 milliGRAM(s) Oral daily  lisinopril 20 milliGRAM(s) Oral daily  melatonin 3 milliGRAM(s) Oral at bedtime  osimertinib 80 milliGRAM(s) Oral daily  tamsulosin 0.4 milliGRAM(s) Oral at bedtime    MEDICATIONS  (PRN):  haloperidol     Tablet 0.25 milliGRAM(s) Oral every 8 hours PRN Agression  haloperidol    Injectable 0.25 milliGRAM(s) IntraMuscular every 8 hours PRN Aggression      CAPILLARY BLOOD GLUCOSE        I&O's Summary      PHYSICAL EXAM:  Vital Signs Last 24 Hrs  T(C): 36.7 (17 Dec 2019 12:05), Max: 37 (16 Dec 2019 20:29)  T(F): 98 (17 Dec 2019 12:05), Max: 98.6 (16 Dec 2019 20:29)  HR: 85 (17 Dec 2019 12:05) (85 - 89)  BP: 109/62 (17 Dec 2019 12:05) (99/54 - 120/62)  BP(mean): --  RR: 17 (17 Dec 2019 12:05) (17 - 17)  SpO2: 97% (17 Dec 2019 12:05) (97% - 100%)    CONSTITUTIONAL: NAD, elderly, frail gentleman, alert, awake, responsive to questioning, communicative, sitting up in chair  RESPIRATORY: Normal respiratory effort; lungs are clear to auscultation bilaterally  CARDIOVASCULAR: Regular rate and rhythm, normal S1 and S2,  No lower extremity edema  ABDOMEN: Nontender to palpation, normoactive bowel sounds  MUSCLOSKELETAL: no clubbing or cyanosis of digits; no joint swelling or tenderness to palpation  PSYCH: calm, oriented to self, "[facility], February"    LABS:                        13.1   5.56  )-----------( 144      ( 17 Dec 2019 05:30 )             39.7     12-17    141  |  105  |  23  ----------------------------<  93  4.0   |  23  |  1.11    Ca    9.3      17 Dec 2019 05:30  Phos  3.3     12-17  Mg     1.9     12-17    TPro  6.6  /  Alb  4.0  /  TBili  0.6  /  DBili  x   /  AST  27  /  ALT  26  /  AlkPhos  105  12-16                RADIOLOGY & ADDITIONAL TESTS:  Results Reviewed:   Imaging Personally Reviewed:  Electrocardiogram Personally Reviewed:    COORDINATION OF CARE:  Care Discussed with Consultants/Other Providers [Y/N]: RN, SW, CM, ACP, psych re overall care   Prior or Outpatient Records Reviewed [Y/N]:

## 2019-12-17 NOTE — DISCHARGE NOTE PROVIDER - NSDCCPCAREPLAN_GEN_ALL_CORE_FT
PRINCIPAL DISCHARGE DIAGNOSIS  Diagnosis: AMS (altered mental status)  Assessment and Plan of Treatment: You were admitted with altered mental status that could be due to advancing dementia or the spread of cancer to your brain as seen on imaging done in the hospital.  You were started on Trazodone by our psychiatry team to help with your mood and behavior.  Continue your medications as directed and follow up with your PCP and psychiatrist for further evaluation and medical management. You may folow up at the geriatric psych clinic [phone], ext [phone]. Follow up with your oncologist, Dr. Fox, at [facility] for further monitoring and treatment of your cancer.      SECONDARY DISCHARGE DIAGNOSES  Diagnosis: Hypertension  Assessment and Plan of Treatment: Continue blood pressure medication regimen as directed. Monitor for any visual changes, headaches or dizziness.  Monitor blood pressure regularly.  Follow up with your PCP for further management for high blood pressure.    Diagnosis: Non-small cell lung cancer  Assessment and Plan of Treatment: Outpatient follow up with your oncologist at [facility] PRINCIPAL DISCHARGE DIAGNOSIS  Diagnosis: Dementia with behavioral disturbance, unspecified dementia type  Assessment and Plan of Treatment: You were admitted with altered mental status that could be due to the spread of cancer to your brain as seen on imaging done in the hospital.  You were started on Trazodone by our psychiatry team to help with your mood and behavior.  Continue your medications as directed and follow up with your PCP and psychiatrist for further evaluation and medical management. You may folow up at the geriatric psych clinic [phone], ext [phone]. Follow up with your oncologist, Dr. Fox, at [facility] for further monitoring and treatment of your cancer.      SECONDARY DISCHARGE DIAGNOSES  Diagnosis: Hypertension  Assessment and Plan of Treatment: Continue blood pressure medication regimen as directed. Monitor for any visual changes, headaches or dizziness.  Monitor blood pressure regularly.  Follow up with your PCP for further management for high blood pressure.    Diagnosis: Benign prostatic hyperplasia, unspecified whether lower urinary tract symptoms present  Assessment and Plan of Treatment: Continue flomax and Proscar as prescribed.    Diagnosis: Non-small cell lung cancer  Assessment and Plan of Treatment: Outpatient follow up with your oncologist at [facility] PRINCIPAL DISCHARGE DIAGNOSIS  Diagnosis: Dementia with behavioral disturbance, unspecified dementia type  Assessment and Plan of Treatment: Please follow up with hospice team  You were admitted with altered mental status that could be due to the spread of cancer to your brain as seen on imaging done in the hospital.  You were started on Trazodone by our psychiatry team to help with your mood and behavior.  Continue your medications as prescribed      SECONDARY DISCHARGE DIAGNOSES  Diagnosis: Hypertension  Assessment and Plan of Treatment: Please follow up with hospice team  Continue blood pressure medication regimen as directed. Monitor for any visual changes, headaches or dizziness.  Monitor blood pressure regularly.  Follow up with your PCP for further management for high blood pressure.    Diagnosis: Benign prostatic hyperplasia, unspecified whether lower urinary tract symptoms present  Assessment and Plan of Treatment: Please follow up with hospice team  Continue flomax and Proscar as prescribed.    Diagnosis: Non-small cell lung cancer  Assessment and Plan of Treatment: Please follow up with hospice team  You were seen by the oncologist and radiation oncologist.    -CXR - Left upper lobe opacity correlates to mass seen on PET/CT 1/22/2019.  -MRI w/wo contrast--Impression: Should say leptomeningeal enhancement involving the right   insular region is now seen suggesting progression of patient's underlying disease process. PRINCIPAL DISCHARGE DIAGNOSIS  Diagnosis: Dementia with behavioral disturbance, unspecified dementia type  Assessment and Plan of Treatment: Please follow up with hospice team  You were admitted with altered mental status that could be due to the spread of cancer to your brain as seen on imaging done in the hospital.  You were started on Trazodone by our psychiatry team to help with your mood and behavior.  Continue your medications as prescribed      SECONDARY DISCHARGE DIAGNOSES  Diagnosis: Benign prostatic hyperplasia, unspecified whether lower urinary tract symptoms present  Assessment and Plan of Treatment: Please follow up with hospice team  Continue flomax and Proscar as prescribed.    Diagnosis: Hypertension  Assessment and Plan of Treatment: Please follow up with hospice team  Your BP has been low/normal  BP medication on hold for now    Diagnosis: Non-small cell lung cancer  Assessment and Plan of Treatment: Please follow up with hospice team  You were seen by the oncologist and radiation oncologist.    -CXR - Left upper lobe opacity correlates to mass seen on PET/CT 1/22/2019.  -MRI w/wo contrast--Impression: Should say leptomeningeal enhancement involving the right   insular region is now seen suggesting progression of patient's underlying disease process. PRINCIPAL DISCHARGE DIAGNOSIS  Diagnosis: Dementia with behavioral disturbance, unspecified dementia type  Assessment and Plan of Treatment: Please follow up with hospice team  You were admitted with altered mental status that could be due to the spread of cancer to your brain as seen on imaging done in the hospital.  You were started on Trazodone by our psychiatry team to help with your mood and behavior.  Continue your medications as prescribed      SECONDARY DISCHARGE DIAGNOSES  Diagnosis: Benign prostatic hyperplasia, unspecified whether lower urinary tract symptoms present  Assessment and Plan of Treatment: Please follow up with hospice team  Continue flomax and Proscar as prescribed.    Diagnosis: Hypertension  Assessment and Plan of Treatment: Please follow up with hospice team  Your BP has been low/normal  BP medication on hold for now    Diagnosis: Non-small cell lung cancer  Assessment and Plan of Treatment: Please follow up with hospice team  You were seen by the oncologist and radiation oncologist.    YOu can follow up with Dr. Moulton radiation at  when you make a decision   -CXR - Left upper lobe opacity correlates to mass seen on PET/CT 1/22/2019.  -MRI w/wo contrast--Impression: Should say leptomeningeal enhancement involving the right   insular region is now seen suggesting progression of patient's underlying disease process. PRINCIPAL DISCHARGE DIAGNOSIS  Diagnosis: Dementia with behavioral disturbance, unspecified dementia type  Assessment and Plan of Treatment: You were admitted with altered mental status that could be due to the spread of cancer to your brain as seen on imaging done in the hospital.  You were started on Trazodone by our psychiatry team to help with your mood and behavior.  Continue your medications as prescribed.  Please call Hospice with any concerns.      SECONDARY DISCHARGE DIAGNOSES  Diagnosis: Non-small cell lung cancer  Assessment and Plan of Treatment: There has been progression of your cancer.  Please follow up with hospice team.    Diagnosis: Benign prostatic hyperplasia, unspecified whether lower urinary tract symptoms present  Assessment and Plan of Treatment: Please follow up with hospice team  Continue flomax and Proscar as prescribed.    Diagnosis: Hypertension  Assessment and Plan of Treatment: Please follow up with hospice team  Your BP has been low/normal  BP medications on hold for now.

## 2019-12-17 NOTE — PROGRESS NOTE BEHAVIORAL HEALTH - NSBHCHARTREVIEWLAB_PSY_A_CORE FT
CBC Full  -  ( 17 Dec 2019 05:30 )  WBC Count : 5.56 K/uL  RBC Count : 4.12 M/uL  Hemoglobin : 13.1 g/dL  Hematocrit : 39.7 %  Platelet Count - Automated : 144 K/uL  Mean Cell Volume : 96.4 fL  Mean Cell Hemoglobin : 31.8 pg  Mean Cell Hemoglobin Concentration : 33.0 %  Auto Neutrophil # : 2.92 K/uL  Auto Lymphocyte # : 1.79 K/uL  Auto Monocyte # : 0.67 K/uL  Auto [id number]    141  |  105  |  23  ----------------------------<  93  4.0   |  23  |  1.11    Ca    9.3      17 Dec 2019 05:30  Phos  3.3     12-17  Mg     1.9     12-17    TPro  6.6  /  Alb  4.0  /  TBili  0.6  /  DBili  x   /  AST  27  /  ALT  26  /  AlkPhos  105  12-16  hil # : 0.13 K/uL  Auto Basophil # : 0.03 K/uL  Auto Neutrophil % : 52.5 %  Auto Lymphocyte % : 32.2 %  Auto Monocyte % : 12.1 %  Auto Eosinophil % : 2.3 %  Auto Basophil % : 0.5 %

## 2019-12-17 NOTE — DISCHARGE NOTE NURSING/CASE MANAGEMENT/SOCIAL WORK - NSSCNAMETXT_GEN_ALL_CORE
[facility] [place] [phone].  Nurse to visit on the day after discharge.  Other appropriate services to be arranged thereafter.

## 2019-12-17 NOTE — DISCHARGE NOTE PROVIDER - CARE PROVIDER_API CALL
stephanie ponce  Phone: (   )    -  Fax: (   )    -  Established Patient  Follow Up Time: 1 week stephanie ponce  Phone: (   )    -  Fax: (   )    -  Established Patient  Follow Up Time: 1 week    hospice care,   Phone: [phone]  Fax: (   )    -  Follow Up Time: stephanie ponce  Phone: (   )    -  Fax: (   )    -  Established Patient  Follow Up Time: 1 week    hospice care,   Phone: [phone]  Fax: (   )    -  Established Patient  Follow Up Time:     Jose Moulton)  Therapeutic Radiology  [address] [place] [address]  [place] [address]  Phone: [phone]  Fax: [phone]  Follow Up Time:

## 2019-12-17 NOTE — PROGRESS NOTE BEHAVIORAL HEALTH - NSBHCHARTREVIEWVS_PSY_A_CORE FT
Vital Signs Last 24 Hrs  T(C): 36.7 (17 Dec 2019 12:05), Max: 37 (16 Dec 2019 20:29)  T(F): 98 (17 Dec 2019 12:05), Max: 98.6 (16 Dec 2019 20:29)  HR: 85 (17 Dec 2019 12:05) (85 - 89)  BP: 109/62 (17 Dec 2019 12:05) (99/54 - 120/62)  BP(mean): --  RR: 17 (17 Dec 2019 12:05) (17 - 17)  SpO2: 97% (17 Dec 2019 12:05) (97% - 100%)

## 2019-12-17 NOTE — DISCHARGE NOTE PROVIDER - HOSPITAL COURSE
89 y/o M with NSCLC with bone and brain mets s/p radiation on immunotx Tagrisso, HTN, BPH p/w confusion and agitation x 1 mos. Unknown etiology        +Dementia with behavioral disturbance.     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        Hospital Course:        Dementia with behavioral disturbance, unspecified dementia type.      - reviewed MRI with radiology, disease seems stable    - Psychiatry consult for behavior disturbance. Started Melatonin, Haldol 0.25mg q8h PRN while in hospital. - Started trazodone 25 hs tonight per psych        Non-small cell cancer of left lung.      - Oncology consulted - Outpatient follow up    - Continue Tagrisso.        Essential hypertension.      - Continue Norvasc, Lisinopril with hold parameter.         Benign prostatic hyperplasia, unspecified whether lower urinary tract symptoms present.      - Continue Proscar and Flomax.         Dispo- PT recommending rehab. Family declining. Home with home care. 89 y/o M with NSCLC with bone and brain mets s/p radiation on immunotx Tagrisso, HTN, BPH p/w confusion and agitation x 1 mos. Unknown etiology            Dementia with behavioral disturbance    -CT Head neg    -MRI brain:Should say leptomeningeal enhancement involving the right     insular region is now seen suggesting progression of patient's underlying     disease process    -Psychiatry consult for behavior disturbance     - Continue Trazodone 25mg q 8pm po- for behaviors, sleep.    - Will add additional Trazodone 25mg q hs prn po- if needed for agitation, sleep.        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 Lama Following     -Continue Tagrisso.     -CXR - Left upper lobe opacity correlates to mass seen on PET/CT 1/22/2019.    -MRI w/wo contrast--Impression: Should say leptomeningeal enhancement involving the right     insular region is now seen suggesting progression of patient's underlying     disease process. Please correlate clinically.    -Rad/onc consulted:    -candidacy for WBRT was discussed with the son Francesco by phone who would like to have a family discussion and will get back to us.    -The benefit and role for RT here is limited which he understood.      -discussed the use of palliative radiation in this setting, namely to improve quality of life through the reduction of symptoms.          Essential hypertension.     -Continue Norvasc, lisinopril with hold parameter.         Benign prostatic hyperplasia     -Continue proscar and flomax.             Dispo: 89 y/o M with NSCLC with bone and brain mets s/p radiation on immunotx Tagrisso, HTN, BPH p/w confusion and agitation x 1 mos. Unknown etiology            Dementia with behavioral disturbance    -CT Head neg    -MRI brain:Should say leptomeningeal enhancement involving the right     insular region is now seen suggesting progression of patient's underlying     disease process    -Psychiatry consult for behavior disturbance     - Continue Trazodone 25mg q 8pm po- for behaviors, sleep.    - Will add additional Trazodone 25mg q hs prn po- if needed for agitation, sleep.        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 Lama Following     -Stop Tagrisso.     -CXR - Left upper lobe opacity correlates to mass seen on PET/CT 1/22/2019.    -MRI w/wo contrast--Impression: Should say leptomeningeal enhancement involving the right     insular region is now seen suggesting progression of patient's underlying     disease process. Please correlate clinically.    -Rad/onc consulted:    -candidacy for WBRT was discussed with the son Francesco by phone who would like to have a family discussion and will get back to us.    -The benefit and role for RT here is limited which he understood.      -discussed the use of palliative radiation in this setting, namely to improve quality of life through the reduction of symptoms.          Essential hypertension.     -Continue Norvasc, lisinopril with hold parameter.         Benign prostatic hyperplasia     -Continue proscar and flomax.     Pt seen and evaluated by Dr. Mccartney and cleared for dc ------            Dispo: Home with home hospice 89 y/o M with NSCLC with bone and brain mets s/p radiation on immunotx Tagrisso, HTN, BPH p/w confusion and agitation x 1 mos. Unknown etiology            Dementia with behavioral disturbance    -CT Head neg    -MRI brain:Should say leptomeningeal enhancement involving the right     insular region is now seen suggesting progression of patient's underlying     disease process    -Psychiatry consult for behavior disturbance     - Continue Trazodone 25mg q 8pm po- for behaviors, sleep.    - Will add additional Trazodone 25mg q hs prn po- if needed for agitation, sleep.        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 Lama Following     -Stop Tagrisso.     -CXR - Left upper lobe opacity correlates to mass seen on PET/CT 1/22/2019.    -MRI w/wo contrast--Impression: Should say leptomeningeal enhancement involving the right     insular region is now seen suggesting progression of patient's underlying     disease process. Please correlate clinically.    -Rad/onc consulted:    -candidacy for WBRT was discussed with the son Francesco by phone who would like to have a family discussion and will get back to us.    -The benefit and role for RT here is limited which he understood.      -discussed the use of palliative radiation in this setting, namely to improve quality of life through the reduction of symptoms.  Family considering and will fu with with their decision. Family can contact Dr. Moulton         Essential hypertension.     -Blood pressure wnl - no meds at this time         Benign prostatic hyperplasia     -Continue proscar and flomax.     Pt seen and evaluated by Dr. Mccartney and cleared for dc ------            Dispo: Home with home hospice 89 y/o M with NSCLC with bone and brain mets s/p radiation on immunotx Tagrisso, HTN, BPH p/w confusion and agitation x 1 mos. Unknown etiology            Dementia with behavioral disturbance    -CT Head neg    -MRI brain:Should say leptomeningeal enhancement involving the right     insular region is now seen suggesting progression of patient's underlying     disease process    -Psychiatry consult for behavior disturbance     - Continue Trazodone 25mg q 8pm po- for behaviors, sleep.    - Will add additional Trazodone 25mg q hs prn po- if needed for agitation, sleep.        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 Lama Following     -Stop Tagrisso.     -CXR - Left upper lobe opacity correlates to mass seen on PET/CT 1/22/2019.    -MRI w/wo contrast--Impression: Should say leptomeningeal enhancement involving the right     insular region is now seen suggesting progression of patient's underlying     disease process. Please correlate clinically.    -Rad/onc consulted:    -candidacy for WBRT was discussed with the son Francesco by phone who would like to have a family discussion and will get back to us.    -The benefit and role for RT here is limited which he understood.      -discussed the use of palliative radiation in this setting, namely to improve quality of life through the reduction of symptoms.  Family considering and will fu with with their decision. Family can contact Dr. Moulton         Essential hypertension.     -Blood pressure wnl - no meds at this time         Benign prostatic hyperplasia     -Continue proscar and flomax.     Pt seen and evaluated by Dr. Mccartney and cleared for dc 12/20/19            Dispo: Home with home hospice 89 y/o M with NSCLC with bone and brain mets s/p radiation on immunotx Tagrisso, HTN, BPH p/w confusion and agitation x 1 mos.            Dementia with behavioral disturbance    -CT Head neg.  MRI brain:Should say leptomeningeal enhancement involving the right insular region is now seen suggesting progression of patient's underlying     disease process    -Psychiatry consult for behavior disturbance - Continue Trazodone 25mg q 8pm po- for behaviors, sleep; Trazodone 25mg q hs prn po- if needed for agitation, sleep.        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 Lama Following.  MRI w/wo contrast--Impression: Should say leptomeningeal enhancement involving the right     insular region is now seen suggesting progression of patient's underlying disease process. Stop Tagrisso. Family d/w med onc and rads onc, disease progressing, opt for     Hospice Care. Stop oral chemo        Essential hypertension.     -Blood pressure wnl - no meds at this time         Benign prostatic hyperplasia     -Continue proscar and flomax.         Dispo: Home with home hospice 89 y/o M with NSCLC with bone and brain mets s/p radiation on immunotx Tagrisso, HTN, BPH p/w confusion and agitation x 1 mos.            Dementia with behavioral disturbance    -CT Head neg.  MRI brain: leptomeningeal enhancement involving the right insular region is now seen suggesting progression of patient's underlying     disease process    -Psychiatry consult for behavior disturbance - Continue Trazodone 25mg q 8pm po- for behaviors, sleep; Trazodone 25mg q hs prn po- if needed for agitation, sleep.        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 Josuearian Following.  MRI w/wo contrast--leptomeningeal enhancement involving the right     insular region is now seen suggesting progression of patient's underlying disease process. Stop Tagrisso. Family d/w med onc and rads onc, disease progressing, opt for     Hospice Care. Stop oral chemo        Essential hypertension.     -Blood pressure wnl - no meds at this time         Benign prostatic hyperplasia     -Continue proscar and flomax.         Dispo: Home with home hospice

## 2019-12-17 NOTE — PROGRESS NOTE BEHAVIORAL HEALTH - NSBHCONSULTFOLLOWDETAILS_PSY_A_CORE FT
will monitor patient till tomorrow, and if no behavioral issues, or prn needs, can go home with family

## 2019-12-17 NOTE — PROGRESS NOTE ADULT - PROBLEM SELECTOR PLAN 6
1) PCP Contacted on Admission: [ x ] Y     [  ] N     [  ] N/A - [facility] - Dr. Lama  2) Date of Contact with PCP: 12/13  3) PCP Contacted at Discharge:  [  ] Y    [  ] N    [  ] N/A -   4) Summary of Handoff Given to PCP:   5) Post-Discharge Appointment Date and Location:

## 2019-12-17 NOTE — DISCHARGE NOTE PROVIDER - NSDCMRMEDTOKEN_GEN_ALL_CORE_FT
amLODIPine 2.5 mg oral tablet: 1 tab(s) orally once a day  aspirin 81 mg oral tablet: 1 tab(s) orally once a day  finasteride 5 mg oral tablet: 1 tab(s) orally once a day  Lipitor 40 mg oral tablet: 1 tab(s) orally once a day  quinapril 20 mg oral tablet: 1 tab(s) orally once a day  Tagrisso 80 mg oral tablet: 1 tab(s) orally once a day  tamsulosin 0.4 mg oral capsule: 1 cap(s) orally once a day aspirin 81 mg oral tablet: 1 tab(s) orally once a day  tamsulosin 0.4 mg oral capsule: 1 cap(s) orally once a day  traZODone: 25 milligram(s) orally once a day (at bedtime), As Needed  traZODone 50 mg oral tablet: 25 milligram(s) orally once a day (at bedtime)  and may have 25 mg orally extra for agitation insomnia at bedtime

## 2019-12-18 DIAGNOSIS — N40.0 BENIGN PROSTATIC HYPERPLASIA WITHOUT LOWER URINARY TRACT SYMPTOMS: ICD-10-CM

## 2019-12-18 LAB
ANION GAP SERPL CALC-SCNC: 16 MMO/L — HIGH (ref 7–14)
BASOPHILS # BLD AUTO: 0.03 K/UL — SIGNIFICANT CHANGE UP (ref 0–0.2)
BASOPHILS NFR BLD AUTO: 0.5 % — SIGNIFICANT CHANGE UP (ref 0–2)
BUN SERPL-MCNC: 22 MG/DL — SIGNIFICANT CHANGE UP (ref 7–23)
CALCIUM SERPL-MCNC: 9.7 MG/DL — SIGNIFICANT CHANGE UP (ref 8.4–10.5)
CHLORIDE SERPL-SCNC: 104 MMOL/L — SIGNIFICANT CHANGE UP (ref 98–107)
CO2 SERPL-SCNC: 23 MMOL/L — SIGNIFICANT CHANGE UP (ref 22–31)
CREAT SERPL-MCNC: 1.04 MG/DL — SIGNIFICANT CHANGE UP (ref 0.5–1.3)
EOSINOPHIL # BLD AUTO: 0.18 K/UL — SIGNIFICANT CHANGE UP (ref 0–0.5)
EOSINOPHIL NFR BLD AUTO: 3.2 % — SIGNIFICANT CHANGE UP (ref 0–6)
GLUCOSE SERPL-MCNC: 80 MG/DL — SIGNIFICANT CHANGE UP (ref 70–99)
HCT VFR BLD CALC: 41.6 % — SIGNIFICANT CHANGE UP (ref 39–50)
HGB BLD-MCNC: 13.8 G/DL — SIGNIFICANT CHANGE UP (ref 13–17)
IMM GRANULOCYTES NFR BLD AUTO: 0.4 % — SIGNIFICANT CHANGE UP (ref 0–1.5)
LYMPHOCYTES # BLD AUTO: 1.61 K/UL — SIGNIFICANT CHANGE UP (ref 1–3.3)
LYMPHOCYTES # BLD AUTO: 28.2 % — SIGNIFICANT CHANGE UP (ref 13–44)
MAGNESIUM SERPL-MCNC: 2 MG/DL — SIGNIFICANT CHANGE UP (ref 1.6–2.6)
MCHC RBC-ENTMCNC: 32.5 PG — SIGNIFICANT CHANGE UP (ref 27–34)
MCHC RBC-ENTMCNC: 33.2 % — SIGNIFICANT CHANGE UP (ref 32–36)
MCV RBC AUTO: 97.9 FL — SIGNIFICANT CHANGE UP (ref 80–100)
MONOCYTES # BLD AUTO: 0.72 K/UL — SIGNIFICANT CHANGE UP (ref 0–0.9)
MONOCYTES NFR BLD AUTO: 12.6 % — SIGNIFICANT CHANGE UP (ref 2–14)
NEUTROPHILS # BLD AUTO: 3.14 K/UL — SIGNIFICANT CHANGE UP (ref 1.8–7.4)
NEUTROPHILS NFR BLD AUTO: 55.1 % — SIGNIFICANT CHANGE UP (ref 43–77)
NRBC # FLD: 0 K/UL — SIGNIFICANT CHANGE UP (ref 0–0)
PHOSPHATE SERPL-MCNC: 3.2 MG/DL — SIGNIFICANT CHANGE UP (ref 2.5–4.5)
PLATELET # BLD AUTO: 137 K/UL — LOW (ref 150–400)
PMV BLD: 10.3 FL — SIGNIFICANT CHANGE UP (ref 7–13)
POTASSIUM SERPL-MCNC: 3.9 MMOL/L — SIGNIFICANT CHANGE UP (ref 3.5–5.3)
POTASSIUM SERPL-SCNC: 3.9 MMOL/L — SIGNIFICANT CHANGE UP (ref 3.5–5.3)
RBC # BLD: 4.25 M/UL — SIGNIFICANT CHANGE UP (ref 4.2–5.8)
RBC # FLD: 13.3 % — SIGNIFICANT CHANGE UP (ref 10.3–14.5)
SODIUM SERPL-SCNC: 143 MMOL/L — SIGNIFICANT CHANGE UP (ref 135–145)
WBC # BLD: 5.7 K/UL — SIGNIFICANT CHANGE UP (ref 3.8–10.5)
WBC # FLD AUTO: 5.7 K/UL — SIGNIFICANT CHANGE UP (ref 3.8–10.5)

## 2019-12-18 PROCEDURE — 99232 SBSQ HOSP IP/OBS MODERATE 35: CPT

## 2019-12-18 PROCEDURE — 99233 SBSQ HOSP IP/OBS HIGH 50: CPT

## 2019-12-18 PROCEDURE — 99221 1ST HOSP IP/OBS SF/LOW 40: CPT

## 2019-12-18 RX ORDER — TRAZODONE HCL 50 MG
25 TABLET ORAL AT BEDTIME
Refills: 0 | Status: DISCONTINUED | OUTPATIENT
Start: 2019-12-18 | End: 2019-12-20

## 2019-12-18 RX ADMIN — TAMSULOSIN HYDROCHLORIDE 0.4 MILLIGRAM(S): 0.4 CAPSULE ORAL at 20:48

## 2019-12-18 RX ADMIN — Medication 25 MILLIGRAM(S): at 20:48

## 2019-12-18 RX ADMIN — ATORVASTATIN CALCIUM 40 MILLIGRAM(S): 80 TABLET, FILM COATED ORAL at 20:48

## 2019-12-18 RX ADMIN — FINASTERIDE 5 MILLIGRAM(S): 5 TABLET, FILM COATED ORAL at 13:35

## 2019-12-18 RX ADMIN — LISINOPRIL 20 MILLIGRAM(S): 2.5 TABLET ORAL at 05:38

## 2019-12-18 RX ADMIN — OSIMERTINIB 80 MILLIGRAM(S): 80 TABLET, FILM COATED ORAL at 13:35

## 2019-12-18 RX ADMIN — AMLODIPINE BESYLATE 2.5 MILLIGRAM(S): 2.5 TABLET ORAL at 05:38

## 2019-12-18 RX ADMIN — Medication 81 MILLIGRAM(S): at 13:37

## 2019-12-18 NOTE — PROGRESS NOTE BEHAVIORAL HEALTH - PRIMARY DX
Dementia with behavioral disturbance, unspecified dementia type

## 2019-12-18 NOTE — PROGRESS NOTE ADULT - SUBJECTIVE AND OBJECTIVE BOX
INTERVAL HPI/OVERNIGHT EVENTS:  Patient seen at bedside.      VITAL SIGNS:  T(F): 97.7 (12-18-19 @ 05:35)  HR: 76 (12-18-19 @ 05:35)  BP: 131/62 (12-18-19 @ 05:35)  RR: 17 (12-18-19 @ 05:35)  SpO2: 98% (12-18-19 @ 05:35)  Wt(kg): --    PHYSICAL EXAM:    Constitutional: NAD, resting in bed comfortably  Eyes: EOMI, sclera non-icteric  Neck: supple, no LAP  Respiratory: CTA b/l, good air entry b/l, no wheezing, rhonchi or crackels  Cardiovascular: RRR, normal S1S2, no M/R/G  Gastrointestinal: soft, NTND  Extremities: no edema  Neurological: AAOx3, non focal  Skin: Normal temperature    MEDICATIONS  (STANDING):  amLODIPine   Tablet 2.5 milliGRAM(s) Oral daily  aspirin enteric coated 81 milliGRAM(s) Oral daily  atorvastatin 40 milliGRAM(s) Oral at bedtime  finasteride 5 milliGRAM(s) Oral daily  lisinopril 20 milliGRAM(s) Oral daily  osimertinib 80 milliGRAM(s) Oral daily  tamsulosin 0.4 milliGRAM(s) Oral at bedtime  traZODone 25 milliGRAM(s) Oral <User Schedule>    MEDICATIONS  (PRN):      Allergies    No Known Allergies    Intolerances        LABS:                        13.8   5.70  )-----------( 137      ( 18 Dec 2019 05:50 )             41.6     12-18    143  |  104  |  22  ----------------------------<  80  3.9   |  23  |  1.04    Ca    9.7      18 Dec 2019 05:50  Phos  3.2     12-18  Mg     2.0     12-18        RADIOLOGY & ADDITIONAL TESTS:  Studies reviewed.  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 Please correlate clinically. INTERVAL HPI/OVERNIGHT EVENTS:  Patient seen at bedside.  He is confused. Is eating lunch.     VITAL SIGNS:  T(F): 97.7 (12-18-19 @ 05:35)  HR: 76 (12-18-19 @ 05:35)  BP: 131/62 (12-18-19 @ 05:35)  RR: 17 (12-18-19 @ 05:35)  SpO2: 98% (12-18-19 @ 05:35)  Wt(kg): --    PHYSICAL EXAM:    Constitutional: NAD, resting in bed comfortably  Eyes: EOMI, sclera non-icteric  Neck: supple, no LAP  Respiratory: CTA b/l, good air entry b/l, no wheezing, rhonchi or crackels  Cardiovascular: RRR, normal S1S2, no M/R/G  Gastrointestinal: soft, NTND  Extremities: no edema  Neurological: alert, confused  Skin: Normal temperature    MEDICATIONS  (STANDING):  amLODIPine   Tablet 2.5 milliGRAM(s) Oral daily  aspirin enteric coated 81 milliGRAM(s) Oral daily  atorvastatin 40 milliGRAM(s) Oral at bedtime  finasteride 5 milliGRAM(s) Oral daily  lisinopril 20 milliGRAM(s) Oral daily  osimertinib 80 milliGRAM(s) Oral daily  tamsulosin 0.4 milliGRAM(s) Oral at bedtime  traZODone 25 milliGRAM(s) Oral <User Schedule>    MEDICATIONS  (PRN):      Allergies    No Known Allergies    Intolerances        LABS:                        13.8   5.70  )-----------( 137      ( 18 Dec 2019 05:50 )             41.6     12-18    143  |  104  |  22  ----------------------------<  80  3.9   |  23  |  1.04    Ca    9.7      18 Dec 2019 05:50  Phos  3.2     12-18  Mg     2.0     12-18        RADIOLOGY & ADDITIONAL TESTS:  Studies reviewed.  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 Please correlate clinically.

## 2019-12-18 NOTE — PROGRESS NOTE BEHAVIORAL HEALTH - NSBHCHARTREVIEWLAB_PSY_A_CORE FT
CBC Full  -  ( 18 Dec 2019 05:50 )  WBC Count : 5.70 K/uL  RBC Count : 4.25 M/uL  Hemoglobin : 13.8 g/dL  Hematocrit : 41.6 %  Platelet Count - Automated : 137 K/uL  Mean Cell Volume : 97.9 fL  Mean Cell Hemoglobin : 32.5 pg  Mean Cell Hemoglobin Concentration : 33.2 %  Auto Neutrophil # : 3.14 K/uL  Auto Lymphocyte # : 1.61 K/uL  Auto Monocyte # : 0.72 K/uL  Auto Eosinophil # : 0.18 K/uL  Auto Basophil # : 0.03 K/uL  Auto Neutrophil % : 55.1 %  Auto Lymphocyte % : 28.2 %  Auto Monocyte % : 12.6 %  Auto Eosinophil % : 3.2 %  Auto Basophil % : 0.5 %  12-18    143  |  104  |  22  ----------------------------<  80  3.9   |  23  |  1.04    Ca    9.7      18 Dec 2019 05:50  Phos  3.2     12-18  Mg     2.0     12-18

## 2019-12-18 NOTE — PROGRESS NOTE BEHAVIORAL HEALTH - NSBHCONSULTFOLLOWAFTERCARE_PSY_A_CORE FT
as per team
psych f/u at rehab would be beneficial    [facility] psych [facility] = [phone], ext [phone]
[facility] [phone], ext [phone]

## 2019-12-18 NOTE — PROGRESS NOTE BEHAVIORAL HEALTH - SUMMARY
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  Psychiatry consulted for agitation management. Patient has been calm, with no behavioral issues. No prn needs.   Confused, disoriented, confabulation. Denies any mood symptoms, denies any si or hi, denies any a.vh or paranoia when asked. Attempted to contact family at [phone]- no answer- left  for call back    12/16- calm, no prn needs since admission, denies any si or hi, and no evident a/vh or paranoia    12/17- received one prn of haldol last night. Family concerned about poor sleep at night. Plan is for home.     12/18- sleep fair, can be resistant to care at night    Recommendations  - Continue current observation status  - Coordinate care with family  - Continue Trazodone 25mg q 8pm po- for behaviors, sleep.  - Will add additional Trazodone 25mg q hs prn po- if needed for agitation, sleep
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  Psychiatry consulted for agitation management. Patient has been calm, with no behavioral issues. No prn needs.   Confused, disoriented, confabulation. Denies any mood symptoms, denies any si or hi, denies any a.vh or paranoia when asked. Attempted to contact family at [phone]- no answer- left  for call back    12/16- calm, no prn needs since admission, denies any si or hi, and no evident a/vh or paranoia    12/17- received one prn of haldol last night. Family concerned about poor sleep at night. Plan is for home.     Recommendations  - Continue current observation status  - Coordinate care with family  - d/c Melatonin  - Start Trazodone 25mg q 8pm po- for behaviors, sleep.  - AGGRESSION NOT RESPONDING TO VERBAL REDIRECTION- Haldol 0.25mg q 8hrs prn im/iv/po- hold for qtc>=500.
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  Psychiatry consulted for agitation management. Patient has been calm, with no behavioral issues. No prn needs.   Confused, disoriented, confabulation. Denies any mood symptoms, denies any si or hi, denies any a.vh or paranoia when asked. Attempted to contact family at [phone]- no answer- left  for call back    12/16- calm, no prn needs since admission, denies any si or hi, and no evident a/vh or paranoia    Recommendations  - Continue current observation status  - Coordinate care with family  - Melatonin 3mg q 8pm po- for sleep wake reversal associated with dementia  - No other standing medications for now.   - AGGRESSION NOT RESPONDING TO VERBAL REDIRECTION- Haldol 0.25mg q 8hrs prn im/iv/po- hold for qtc>=500

## 2019-12-18 NOTE — PROGRESS NOTE ADULT - PROBLEM SELECTOR PLAN 6
1) PCP Contacted on Admission: [ x ] Y     [  ] N     [  ] N/A - [facility] - Dr. Lama  2) Date of Contact with PCP: 12/13  3) PCP Contacted at Discharge:  [  ] Y    [  ] N    [  ] N/A -   4) Summary of Handoff Given to PCP:   5) Post-Discharge Appointment Date and Location:

## 2019-12-18 NOTE — PROGRESS NOTE ADULT - ASSESSMENT
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     Concern for progression of brain mets.  He was found to have progression of mets in spine recently and had RT to spine.   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     -Radiation oncology consult  -Goals of care discussion is appropriate. Woulf discuss with family and if interested, can request palliative care consult.  -  -  -Can c/w tagrisso for now  -Supportive care, pain control, Nutrition, PT, DVT ppx  -Outpatient oncology f/u    Will follow. Please do not hesitate to call back with questions.     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     He was found to have progression of mets in spine recently and had RT to spine.   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     -Radiation oncology consult  -Goals of care discussion is appropriate. Would discuss with family and if interested, can request palliative care consult and hospice referral.  -Can c/w tagrisso for now  -Poor prognosis  -Supportive care, pain control, Nutrition, PT, DVT ppx  -Outpatient oncology f/u    Will follow. Please do not hesitate to call back with questions.     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     He was found to have progression of mets in spine recently and had RT to spine.   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     -Radiation oncology consult  -Goals of care discussion is appropriate. Would discuss with family and if interested, can request palliative care consult and hospice referral.  -Can c/w tagrisso for now  -Poor prognosis  -Supportive care, pain control, Nutrition, PT, DVT ppx  -Outpatient oncology f/u    Will follow. Please do not hesitate to call back with questions.   Discussed with son in law over the phone, and with patient's outpatient oncologist Dr Ruddy Lama MD  Medical Oncology Attending  C: [phone]

## 2019-12-18 NOTE — PROGRESS NOTE ADULT - PROBLEM SELECTOR PLAN 1
Onc reviewed MRI with radiology again +disease progression  - Psychiatry consult for behavior disturbance. Started Melatonin, Haldol 0.25mg q8h PRN.  D/w psych c/w  trazodone 25 hs tonight, add PRN as well

## 2019-12-18 NOTE — PROGRESS NOTE BEHAVIORAL HEALTH - NSBHADMITCOUNSEL_PSY_A_CORE
risks and benefits of treatment options/importance of adherence to chosen treatment/client/family/caregiver education/instructions for management, treatment and follow up/risk factor reduction
risks and benefits of treatment options/importance of adherence to chosen treatment/client/family/caregiver education/instructions for management, treatment and follow up/risk factor reduction
instructions for management, treatment and follow up/risk factor reduction/client/family/caregiver education/risks and benefits of treatment options/importance of adherence to chosen treatment

## 2019-12-18 NOTE — PROGRESS NOTE BEHAVIORAL HEALTH - NSBHFUPINTERVALCCFT_PSY_A_CORE
No prn's in the last 24 hours. Discussed with RN- can be resistant to care at night- agitation. Sleep was fair at night

## 2019-12-18 NOTE — PROGRESS NOTE BEHAVIORAL HEALTH - NSBHCHARTREVIEWVS_PSY_A_CORE FT
Vital Signs Last 24 Hrs  T(C): 36.5 (18 Dec 2019 05:35), Max: 36.7 (17 Dec 2019 20:58)  T(F): 97.7 (18 Dec 2019 05:35), Max: 98 (17 Dec 2019 20:58)  HR: 76 (18 Dec 2019 05:35) (76 - 76)  BP: 131/62 (18 Dec 2019 05:35) (124/67 - 131/62)  BP(mean): --  RR: 17 (18 Dec 2019 05:35) (17 - 17)  SpO2: 98% (18 Dec 2019 05:35) (97% - 98%)

## 2019-12-18 NOTE — PROGRESS NOTE BEHAVIORAL HEALTH - NSBHFUPINTERVALHXFT_PSY_A_CORE
met with the patient. Calm, eating his lunch. Remains confused, disoriented. No mood symptoms, denies any si or hi, denies any a.vh or paranoia when asked.   Care coordinated with son in law at bedside and below plan was discussed and agreed upon

## 2019-12-18 NOTE — PROGRESS NOTE ADULT - SUBJECTIVE AND OBJECTIVE BOX
[facility] Division of Hospital Medicine  Rehana Mccartney MD  Pager (M-F, 8A-5P):  In-house pager [phone]; Long-range pager [phone]  Other Times:  Please page Hospitalist in Charge -  In-house pager [phone]    Patient is a 89y old  Male who presents with a chief complaint of Confusion, agitation (18 Dec 2019 10:25)      SUBJECTIVE / OVERNIGHT EVENTS: Staff reports pt restless over night, did not need PRN, able to redirect.  Seen earlier today with family at bedside.  Pt sleeping most of time.  ADDITIONAL REVIEW OF SYSTEMS:    MEDICATIONS  (STANDING):  amLODIPine   Tablet 2.5 milliGRAM(s) Oral daily  aspirin enteric coated 81 milliGRAM(s) Oral daily  atorvastatin 40 milliGRAM(s) Oral at bedtime  finasteride 5 milliGRAM(s) Oral daily  lisinopril 20 milliGRAM(s) Oral daily  osimertinib 80 milliGRAM(s) Oral daily  tamsulosin 0.4 milliGRAM(s) Oral at bedtime  traZODone 25 milliGRAM(s) Oral <User Schedule>    MEDICATIONS  (PRN):  traZODone 25 milliGRAM(s) Oral at bedtime PRN agitation, insomnia      CAPILLARY BLOOD GLUCOSE        I&O's Summary      PHYSICAL EXAM:  Vital Signs Last 24 Hrs  T(C): 37.1 (18 Dec 2019 14:35), Max: 37.1 (18 Dec 2019 14:35)  T(F): 98.7 (18 Dec 2019 14:35), Max: 98.7 (18 Dec 2019 14:35)  HR: 80 (18 Dec 2019 14:35) (76 - 80)  BP: 110/64 (18 Dec 2019 14:50) (110/64 - 131/62)  BP(mean): --  RR: 18 (18 Dec 2019 14:35) (17 - 18)  SpO2: 99% (18 Dec 2019 14:35) (97% - 99%)    CONSTITUTIONAL: NAD, elderly, frail gentleman, alert, awake, responsive to questioning, communicative, sitting up in chair  RESPIRATORY: Normal respiratory effort; lungs are clear to auscultation bilaterally  CARDIOVASCULAR: Regular rate and rhythm, normal S1 and S2,  No lower extremity edema  ABDOMEN: Nontender to palpation, normoactive bowel sounds  MUSCLOSKELETAL: no clubbing or cyanosis of digits; no joint swelling or tenderness to palpation  PSYCH: sleepy, calm, oriented to self    LABS:                        13.8   5.70  )-----------( 137      ( 18 Dec 2019 05:50 )             41.6     12-18    143  |  104  |  22  ----------------------------<  80  3.9   |  23  |  1.04    Ca    9.7      18 Dec 2019 05:50  Phos  3.2     12-18  Mg     2.0     12-18                  RADIOLOGY & ADDITIONAL TESTS:  Results Reviewed:   Imaging Personally Reviewed:  Electrocardiogram Personally Reviewed:    COORDINATION OF CARE:  Care Discussed with Consultants/Other Providers [Y/N]: RN, SW, CM, ACP, med onc, rads onc re overall care   Prior or Outpatient Records Reviewed [Y/N]:

## 2019-12-18 NOTE — PROGRESS NOTE BEHAVIORAL HEALTH - RISK ASSESSMENT
older male, dementia? delirium, impulsive, was agitated at home, sleep can be fractured, compliant, denies any si or hi, denies any a.vh or paranoia.
older male, dementia? delirium, impulsive, was agitated at home, sleep can be fractured, compliant, denies any si or hi, denies any a.vh or paranoia.
older male, dementia? delirium, impulsive, was agitated at home- not agitated here on the floor, no prn needs, sleep fair, compliant, denies any si or hi, denies any a.vh or paranoia

## 2019-12-18 NOTE — CONSULT NOTE ADULT - SUBJECTIVE AND OBJECTIVE BOX
HPI:  89 y/o M with NSCLC with bone and brain mets s/p radiation with Dr. Brian 10/2019 to the C-spine as 800x1, on Tagrisso, came to the ED on 12/12/19 at [facility] for worsening confusion and confabulating.       Pt's son-in-law reports that starting late October to early November, pt started developing worsening confusion.  He states that pt at baseline had no signs of cognitive decline.  He has become prone to angry outbursts where previously was calm.  Family reports that pt has been "talking nonsense".  No recent fever or chills.  No other new symptoms.  Per family pt underwent radiation to new neck lesion seen on PET on 10/22.  He also was started on medical marijuana around the same time.      Pt is unsure why he is in the hospital, but reports no complaints.  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       < from: MR Head w/wo IV Cont (12.17.19 @ 11:11) >  EXAM:  MR BRAIN WAW IC        *** ADDENDUM 12/18/2019  ***    Impression: Should say leptomeningeal enhancement involving the right   insular region is now seen suggesting progression of patient's underlying   disease process. Please correlate clinically.      *** END OF ADDENDUM 12/18/2019  ***        Allergies    No Known Allergies    Intolerances        ROS: [  ] Fever  [  ] Chills  [  ]Chest Pain [  ] SOB  [  ]Cough [  ] N/V  [  ] Diarrhea [  ]Constipation [  ]Other ROS:  [  ] ROS otherwise negative    PAST MEDICAL & SURGICAL HISTORY:  BPH (benign prostatic hyperplasia)  Essential hypertension  Non-small cell lung cancer (NSCLC)      FAMILY HISTORY:  No pertinent family history in first degree relatives      MEDICATIONS  (STANDING):  amLODIPine   Tablet 2.5 milliGRAM(s) Oral daily  aspirin enteric coated 81 milliGRAM(s) Oral daily  atorvastatin 40 milliGRAM(s) Oral at bedtime  finasteride 5 milliGRAM(s) Oral daily  lisinopril 20 milliGRAM(s) Oral daily  osimertinib 80 milliGRAM(s) Oral daily  tamsulosin 0.4 milliGRAM(s) Oral at bedtime  traZODone 25 milliGRAM(s) Oral <User Schedule>    MEDICATIONS  (PRN):      PHYSICAL EXAM  Vital Signs Last 24 Hrs  T(C): 36.5 (18 Dec 2019 05:35), Max: 36.7 (17 Dec 2019 12:05)  T(F): 97.7 (18 Dec 2019 05:35), Max: 98 (17 Dec 2019 12:05)  HR: 76 (18 Dec 2019 05:35) (76 - 85)  BP: 131/62 (18 Dec 2019 05:35) (109/62 - 131/62)  BP(mean): --  RR: 17 (18 Dec 2019 05:35) (17 - 17)  SpO2: 98% (18 Dec 2019 05:35) (97% - 98%)    General: Well nourished, well developed, no acute distress  HEENT: NC/AT; EOMI, PERRL, sclera nonicteric; external ears normal; no rhinorrhea or epistaxis; mucous membranes moist; oropharynx clear and without erythema  CV: NR, RR; no appreciable r/m/g  Lungs: CTAB, no increased work of breathing  Abodmen: Bowel sounds present; soft, NTND  MSK: Vertebral spine non-tender to palpation  Neuro: AAOx; cranial nerves II-XII intact; strength 5/5 in upper and lower extremities; sensation to light touch in tact bilaterally.  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   He came to the ED for worsening confusion but is otherwise stable.   He would be a candidate for WBRT whether as an inpatient or outpatient.  If he is stable for discharge to home we can arrange outpatient treatment at [facility].     We discussed the use of palliative radiation in this setting, namely to improve quality of life through the reduction of symptoms.  We talked about the risks, benefits, acute and long term side effects, as well as expected treatment outcomes.  He/She was given the opportunity to ask questions, which were answered to his/her apparent satisfaction.  He/She provided written consent to proceed with radiation therapy. We will arrange for inpatient/outpatient treatment.     [phone] HPI:  89 y/o M with NSCLC with bone and brain mets s/p radiation with Dr. Brian 10/2019 to the C-spine as 800x1, on Tagrisso, came to the ED on 12/12/19 at [facility] for worsening confusion and confabulating.       Pt's son-in-law reports that starting late October to early November, pt started developing worsening confusion.  He states that pt at baseline had no signs of cognitive decline.  He has become prone to angry outbursts where previously was calm.  Family reports that pt has been "talking nonsense".  No recent fever or chills.  No other new symptoms.  Per family pt underwent radiation to new neck lesion seen on PET on 10/22.  He also was started on medical marijuana around the same time.      Pt is unsure why he is in the hospital, but reports no complaints.  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   Patient is a.o. x 1 with KPS of 50 now laying in bed, NAD.  He believes the President is [place], the year is 2000, and he is in a hospital in [place].  He does know his age however and told me he is "about 89 years old."     I spoke with the son Francesco by phone and explained the role of radiation treatment and any limited benefit it may offer.  He will discuss with the family and let us know.       < from: MR Head w/wo IV Cont (12.17.19 @ 11:11) >  EXAM:  MR BRAIN WAW IC        *** ADDENDUM 12/18/2019  ***    Impression: Should say leptomeningeal enhancement involving the right   insular region is now seen suggesting progression of patient's underlying   disease process. Please correlate clinically.      *** END OF ADDENDUM 12/18/2019  ***        Allergies    No Known Allergies    Intolerances        ROS: [  ] Fever  [  ] Chills  [  ]Chest Pain [  ] SOB  [  ]Cough [  ] N/V  [  ] Diarrhea [  ]Constipation [  ]Other ROS:  [  ] ROS otherwise negative    PAST MEDICAL & SURGICAL HISTORY:  BPH (benign prostatic hyperplasia)  Essential hypertension  Non-small cell lung cancer (NSCLC)      FAMILY HISTORY:  No pertinent family history in first degree relatives      MEDICATIONS  (STANDING):  amLODIPine   Tablet 2.5 milliGRAM(s) Oral daily  aspirin enteric coated 81 milliGRAM(s) Oral daily  atorvastatin 40 milliGRAM(s) Oral at bedtime  finasteride 5 milliGRAM(s) Oral daily  lisinopril 20 milliGRAM(s) Oral daily  osimertinib 80 milliGRAM(s) Oral daily  tamsulosin 0.4 milliGRAM(s) Oral at bedtime  traZODone 25 milliGRAM(s) Oral <User Schedule>    MEDICATIONS  (PRN):      PHYSICAL EXAM  Vital Signs Last 24 Hrs  T(C): 36.5 (18 Dec 2019 05:35), Max: 36.7 (17 Dec 2019 12:05)  T(F): 97.7 (18 Dec 2019 05:35), Max: 98 (17 Dec 2019 12:05)  HR: 76 (18 Dec 2019 05:35) (76 - 85)  BP: 131/62 (18 Dec 2019 05:35) (109/62 - 131/62)  BP(mean): --  RR: 17 (18 Dec 2019 05:35) (17 - 17)  SpO2: 98% (18 Dec 2019 05:35) (97% - 98%)    General:thin,, no acute distress  HEENT: +glasses, NC/AT; EOMI, PERRL, sclera nonicteric; external ears normal; no rhinorrhea or epistaxis; mucous membranes moist; oropharynx clear and without erythema  CV: NR, RR; no appreciable r/m/g  Lungs: CTAB, no increased work of breathing  Abodmen: Bowel sounds present; soft, NTND  MSK: Vertebral spine non-tender to palpation  Neuro: AAOx1 ; cranial nerves II-XII intact; strength 5/5 in upper and lower extremities; sensation to light touch in tact bilaterally.  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   He came to the ED for worsening confusion but is otherwise stable.  His candidacy for WBRT was discussed with the son Francesco by phone who would like to have a family discussion and will get back to us.  The benefit and role for RT here is limited which he understood.      We discussed the use of palliative radiation in this setting, namely to improve quality of life through the reduction of symptoms.  We talked about the risks, benefits, acute and long term side effects, as well as expected treatment outcomes.      [phone] HPI:  89 y/o M with NSCLC with bone and brain mets s/p radiation with Dr. Brian 10/2019 to the C-spine as 800x1, on Tagrisso, came to the ED on 12/12/19 at [facility] for worsening confusion and confabulating.       Pt's son-in-law reports that starting late October to early November, pt started developing worsening confusion.  He states that pt at baseline had no signs of cognitive decline.  He has become prone to angry outbursts where previously was calm.  Family reports that pt has been "talking nonsense".  No recent fever or chills.  No other new symptoms.  Per family pt underwent radiation to new neck lesion seen on PET on 10/22.  He also was started on medical marijuana around the same time.      Pt is unsure why he is in the hospital, but reports no complaints.  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   Patient is a.o. x 1 with KPS of 50 now laying in bed, NAD.  He believes the President is [place], the year is 2000, and he is in a hospital in [place].  He does know his age however and told me he is "about 89 years old."     I spoke with the son Francesco by phone and explained the role of radiation treatment and any very limited benefit it may offer.  He will discuss with the family and let us know.     < from: MR Head w/wo IV Cont (12.17.19 @ 11:11) >  EXAM:  MR BRAIN WAW IC      *** ADDENDUM 12/18/2019  ***    Impression: Should say leptomeningeal enhancement involving the right   insular region is now seen suggesting progression of patient's underlying   disease process. Please correlate clinically.    No Known Allergies    Intolerances        ROS: [  ] Fever  [  ] Chills  [  ]Chest Pain [  ] SOB  [  ]Cough [  ] N/V  [  ] Diarrhea [  ]Constipation [  ]Other ROS:  [  ] ROS otherwise negative    PAST MEDICAL & SURGICAL HISTORY:  BPH (benign prostatic hyperplasia)  Essential hypertension  Non-small cell lung cancer (NSCLC)      FAMILY HISTORY:  No pertinent family history in first degree relatives      MEDICATIONS  (STANDING):  amLODIPine   Tablet 2.5 milliGRAM(s) Oral daily  aspirin enteric coated 81 milliGRAM(s) Oral daily  atorvastatin 40 milliGRAM(s) Oral at bedtime  finasteride 5 milliGRAM(s) Oral daily  lisinopril 20 milliGRAM(s) Oral daily  osimertinib 80 milliGRAM(s) Oral daily  tamsulosin 0.4 milliGRAM(s) Oral at bedtime  traZODone 25 milliGRAM(s) Oral <User Schedule>    MEDICATIONS  (PRN):      PHYSICAL EXAM  Vital Signs Last 24 Hrs  T(C): 36.5 (18 Dec 2019 05:35), Max: 36.7 (17 Dec 2019 12:05)  T(F): 97.7 (18 Dec 2019 05:35), Max: 98 (17 Dec 2019 12:05)  HR: 76 (18 Dec 2019 05:35) (76 - 85)  BP: 131/62 (18 Dec 2019 05:35) (109/62 - 131/62)  BP(mean): --  RR: 17 (18 Dec 2019 05:35) (17 - 17)  SpO2: 98% (18 Dec 2019 05:35) (97% - 98%)    General:thin,, no acute distress  HEENT: +glasses, NC/AT; EOMI, PERRL, sclera nonicteric; external ears normal; no rhinorrhea or epistaxis; mucous membranes moist; oropharynx clear and without erythema  CV: NR, RR; no appreciable r/m/g  Lungs: CTAB, no increased work of breathing  Abodmen: Bowel sounds present; soft, NTND  MSK: Vertebral spine non-tender to palpation  Neuro: AAOx1 ; cranial nerves II-XII intact; strength 5/5 in upper and lower extremities; sensation to light touch intact bilaterally.  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   He came to the ED for worsening confusion but is otherwise stable.  His candidacy for WBRT was discussed with the son Francesco by phone who would like to have a family discussion and will get back to us.  The benefit and role for RT here is limited which he understood.    We discussed the use of palliative radiation in this setting, namely to improve quality of life through the reduction of symptoms.  We talked about the risks, benefits, acute and long term side effects, as well as expected treatment outcomes.     Jose Moulton MD  cell [phone] HPI:  89 y/o M with NSCLC with bone and brain mets s/p radiation with Dr. Brian 10/2019 to the C-spine , 800cGy x1 fraction, on Tagrisso, came to the ED on 12/12/19 at [facility] for worsening confusion and confabulating.       Pt's son-in-law reports that starting late October to early November, pt started developing worsening confusion.  He states that pt at baseline had no signs of cognitive decline.  Confusion much worse in last 3 weeks. He has become prone to angry outbursts where previously was calm.  Family reports that pt has been "talking nonsense".  No recent fever or chills.  No other new symptoms.  Per family pt underwent radiation to new neck lesion seen on PET on 10/22.  He also was started on medical marijuana around the same time.      Pt is unsure why he is in the hospital, but reports no complaints.  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   Patient is a.o. x 1 with KPS of 50 now laying in bed, NAD.  He believes the President is [place], the year is 2000, and he is in a hospital in [place].  He does know his age however and told me he is "about 89 years old."     I< from: MR Head w/wo IV Cont (12.17.19 @ 11:11) >  EXAM:  MR BRAIN WAW IC      *** ADDENDUM 12/18/2019  ***    Impression: Should say leptomeningeal enhancement involving the right   insular region is now seen suggesting progression of patient's underlying   disease process. Please correlate clinically.    No Known Allergies    Intolerances    ROS: [  ] Fever  [  ] Chills  [  ]Chest Pain [  ] SOB  [  ]Cough [  ] N/V  [  ] Diarrhea [  ]Constipation [  ]Other ROS:  [  ] ROS otherwise negative    PAST MEDICAL & SURGICAL HISTORY:  BPH (benign prostatic hyperplasia)  Essential hypertension  Non-small cell lung cancer (NSCLC)      FAMILY HISTORY:  No pertinent family history in first degree relatives      MEDICATIONS  (STANDING):  amLODIPine   Tablet 2.5 milliGRAM(s) Oral daily  aspirin enteric coated 81 milliGRAM(s) Oral daily  atorvastatin 40 milliGRAM(s) Oral at bedtime  finasteride 5 milliGRAM(s) Oral daily  lisinopril 20 milliGRAM(s) Oral daily  osimertinib 80 milliGRAM(s) Oral daily  tamsulosin 0.4 milliGRAM(s) Oral at bedtime  traZODone 25 milliGRAM(s) Oral <User Schedule>    MEDICATIONS  (PRN):      PHYSICAL EXAM- KPS 50  Vital Signs Last 24 Hrs  T(C): 36.5 (18 Dec 2019 05:35), Max: 36.7 (17 Dec 2019 12:05)  T(F): 97.7 (18 Dec 2019 05:35), Max: 98 (17 Dec 2019 12:05)  HR: 76 (18 Dec 2019 05:35) (76 - 85)  BP: 131/62 (18 Dec 2019 05:35) (109/62 - 131/62)  BP(mean): --  RR: 17 (18 Dec 2019 05:35) (17 - 17)  SpO2: 98% (18 Dec 2019 05:35) (97% - 98%)    General: cachectic,, no acute distress  HEENT: +glasses, NC/AT; EOMI, PERRL, sclera nonicteric; external ears normal; no rhinorrhea or epistaxis; mucous membranes moist; oropharynx clear and without erythema  CV: NR, RR; no appreciable r/m/g  Lungs: CTAB, no increased work of breathing  Abdomen: Bowel sounds present; soft, NTND  MSK: Vertebral spine non-tender to palpation  Neuro: AAOx1 ; cranial nerves II-XII intact; strength 4/5 in upper and lower extremities; sensation to light touch intact bilaterally. No CN deficits.  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   He came to the ED for worsening confusion but is otherwise stable.  His candidacy for WBRT was discussed with the son Francesco by phone who would like to have a family discussion and will get back to us.  The benefit and role for RT here is limited which he understood, to perhaps some quality of life improvement, with probably minimal impact on survival of perhaps several months.  We discussed the use of palliative radiation in this setting, namely to improve quality of life through the reduction of symptoms.  We talked about the risks, benefits, acute and long term side effects, as well as expected treatment outcomes.     Jose Moulton MD  cell [phone]

## 2019-12-18 NOTE — PROGRESS NOTE ADULT - PROBLEM SELECTOR PLAN 2
- Appreciate Oncology input - in light of disease progression, no further chemo recommended, recommend comfort care;  also had family d/w med onc and rads onc - will let us know tomorrow if will pursue further RT, but seem to be leaning towards not.  Hospice referral made.  MOLST form given to family to review, will f/u tomorrow  - Continue Tagrisso for now, will d/w family re stopping

## 2019-12-19 LAB
ANION GAP SERPL CALC-SCNC: 10 MMO/L — SIGNIFICANT CHANGE UP (ref 7–14)
BASOPHILS # BLD AUTO: 0.03 K/UL — SIGNIFICANT CHANGE UP (ref 0–0.2)
BASOPHILS NFR BLD AUTO: 0.5 % — SIGNIFICANT CHANGE UP (ref 0–2)
BUN SERPL-MCNC: 23 MG/DL — SIGNIFICANT CHANGE UP (ref 7–23)
CALCIUM SERPL-MCNC: 9.8 MG/DL — SIGNIFICANT CHANGE UP (ref 8.4–10.5)
CHLORIDE SERPL-SCNC: 105 MMOL/L — SIGNIFICANT CHANGE UP (ref 98–107)
CO2 SERPL-SCNC: 25 MMOL/L — SIGNIFICANT CHANGE UP (ref 22–31)
CREAT SERPL-MCNC: 1.11 MG/DL — SIGNIFICANT CHANGE UP (ref 0.5–1.3)
EOSINOPHIL # BLD AUTO: 0.21 K/UL — SIGNIFICANT CHANGE UP (ref 0–0.5)
EOSINOPHIL NFR BLD AUTO: 3.7 % — SIGNIFICANT CHANGE UP (ref 0–6)
GLUCOSE SERPL-MCNC: 87 MG/DL — SIGNIFICANT CHANGE UP (ref 70–99)
HCT VFR BLD CALC: 41.7 % — SIGNIFICANT CHANGE UP (ref 39–50)
HGB BLD-MCNC: 13.8 G/DL — SIGNIFICANT CHANGE UP (ref 13–17)
IMM GRANULOCYTES NFR BLD AUTO: 0.7 % — SIGNIFICANT CHANGE UP (ref 0–1.5)
LYMPHOCYTES # BLD AUTO: 1.87 K/UL — SIGNIFICANT CHANGE UP (ref 1–3.3)
LYMPHOCYTES # BLD AUTO: 33 % — SIGNIFICANT CHANGE UP (ref 13–44)
MAGNESIUM SERPL-MCNC: 2 MG/DL — SIGNIFICANT CHANGE UP (ref 1.6–2.6)
MCHC RBC-ENTMCNC: 31.8 PG — SIGNIFICANT CHANGE UP (ref 27–34)
MCHC RBC-ENTMCNC: 33.1 % — SIGNIFICANT CHANGE UP (ref 32–36)
MCV RBC AUTO: 96.1 FL — SIGNIFICANT CHANGE UP (ref 80–100)
MONOCYTES # BLD AUTO: 0.69 K/UL — SIGNIFICANT CHANGE UP (ref 0–0.9)
MONOCYTES NFR BLD AUTO: 12.2 % — SIGNIFICANT CHANGE UP (ref 2–14)
NEUTROPHILS # BLD AUTO: 2.83 K/UL — SIGNIFICANT CHANGE UP (ref 1.8–7.4)
NEUTROPHILS NFR BLD AUTO: 49.9 % — SIGNIFICANT CHANGE UP (ref 43–77)
NRBC # FLD: 0 K/UL — SIGNIFICANT CHANGE UP (ref 0–0)
PHOSPHATE SERPL-MCNC: 3.5 MG/DL — SIGNIFICANT CHANGE UP (ref 2.5–4.5)
PLATELET # BLD AUTO: 133 K/UL — LOW (ref 150–400)
PMV BLD: 10 FL — SIGNIFICANT CHANGE UP (ref 7–13)
POTASSIUM SERPL-MCNC: 4.3 MMOL/L — SIGNIFICANT CHANGE UP (ref 3.5–5.3)
POTASSIUM SERPL-SCNC: 4.3 MMOL/L — SIGNIFICANT CHANGE UP (ref 3.5–5.3)
RBC # BLD: 4.34 M/UL — SIGNIFICANT CHANGE UP (ref 4.2–5.8)
RBC # FLD: 13.1 % — SIGNIFICANT CHANGE UP (ref 10.3–14.5)
SODIUM SERPL-SCNC: 140 MMOL/L — SIGNIFICANT CHANGE UP (ref 135–145)
WBC # BLD: 5.67 K/UL — SIGNIFICANT CHANGE UP (ref 3.8–10.5)
WBC # FLD AUTO: 5.67 K/UL — SIGNIFICANT CHANGE UP (ref 3.8–10.5)

## 2019-12-19 PROCEDURE — 99233 SBSQ HOSP IP/OBS HIGH 50: CPT

## 2019-12-19 RX ORDER — SODIUM CHLORIDE 9 MG/ML
500 INJECTION, SOLUTION INTRAVENOUS
Refills: 0 | Status: DISCONTINUED | OUTPATIENT
Start: 2019-12-19 | End: 2019-12-20

## 2019-12-19 RX ORDER — SODIUM CHLORIDE 9 MG/ML
500 INJECTION, SOLUTION INTRAVENOUS ONCE
Refills: 0 | Status: COMPLETED | OUTPATIENT
Start: 2019-12-19 | End: 2019-12-19

## 2019-12-19 RX ADMIN — Medication 81 MILLIGRAM(S): at 13:10

## 2019-12-19 RX ADMIN — Medication 25 MILLIGRAM(S): at 21:07

## 2019-12-19 RX ADMIN — ATORVASTATIN CALCIUM 40 MILLIGRAM(S): 80 TABLET, FILM COATED ORAL at 21:07

## 2019-12-19 RX ADMIN — AMLODIPINE BESYLATE 2.5 MILLIGRAM(S): 2.5 TABLET ORAL at 05:34

## 2019-12-19 RX ADMIN — FINASTERIDE 5 MILLIGRAM(S): 5 TABLET, FILM COATED ORAL at 13:10

## 2019-12-19 RX ADMIN — LISINOPRIL 20 MILLIGRAM(S): 2.5 TABLET ORAL at 05:34

## 2019-12-19 RX ADMIN — TAMSULOSIN HYDROCHLORIDE 0.4 MILLIGRAM(S): 0.4 CAPSULE ORAL at 21:07

## 2019-12-19 RX ADMIN — OSIMERTINIB 80 MILLIGRAM(S): 80 TABLET, FILM COATED ORAL at 13:11

## 2019-12-19 RX ADMIN — SODIUM CHLORIDE 500 MILLILITER(S): 9 INJECTION, SOLUTION INTRAVENOUS at 13:11

## 2019-12-19 NOTE — DIETITIAN INITIAL EVALUATION ADULT. - ADD RECOMMEND
1)Continue regular diet to optimize po intake potential, add Ensure Enlive 1x daily (provides 350 kaushik, 20gm protein) to assist pt in meeting estimated nutrition needs. 2)Encourage po intake as tolerated & assistance with meals as needed. 3)Monitor tolerance to nutrition supplement/diet texture, po intake, weight, pertinent labs, GI distress, skin integrity. 1)Continue regular diet to optimize po intake potential, add Ensure Enlive 2x daily (provides 700 kaushik, 40gm protein).2)Consider addition of multivitamin daily in setting of prolonged suboptimal po intake.3)Discussed high protein/kcal foods. Food preferences obtained, will provide as able.4)Encourage po intake as tolerated&assistance with meals as needed.5)Monitor tolerance to nutrition supplement,po intake,weight,pertinent labs,GI distress,skin integrity.

## 2019-12-19 NOTE — GOALS OF CARE CONVERSATION - ADVANCED CARE PLANNING - CONVERSATION DETAILS
[facility]     This writer met with son in law Maya, and spouse Rachna Sebastian. Discussed hospice services. Consents signed. DME requested for tomorrow morning between 9am and 12pm. Tentative d/c for tomorrow 3pm.    Marta Hunt RN

## 2019-12-19 NOTE — DIETITIAN INITIAL EVALUATION ADULT. - SIGNS/SYMPTOMS
severe muscle/fat loss, po intake<75% of estimated needs for >1 month, 6.25% weight loss severe muscle/fat loss, po intake<75% of estimated needs for >1 month, 6.25% weight loss x8 months

## 2019-12-19 NOTE — PROGRESS NOTE ADULT - PROBLEM SELECTOR PLAN 6
1) PCP Contacted on Admission: [ x ] Y     [  ] N     [  ] N/A - [facility] - Dr. Lama  2) Date of Contact with PCP: 12/13  3) PCP Contacted at Discharge:  [  ] Y    [  ] N    [  ] N/A -   4) Summary of Handoff Given to PCP:   5) Post-Discharge Appointment Date and Location:

## 2019-12-19 NOTE — DIETITIAN INITIAL EVALUATION ADULT. - PHYSICAL APPEARANCE
other (specify)/underweight Per nursing flowsheets, no edema & or pressure injuries noted.  NFPE completed with pt consent.   Subcutaneous Fat Loss: [X]Moderate Triceps  Muscle loss: [X]Severe Temporalis [X]Moderate Clavicle Region [X]Severe Deltoid [X]Severe Scapula Region [X]Severe Quadriceps [X]Severe Gastrocnemius

## 2019-12-19 NOTE — PROGRESS NOTE ADULT - PROBLEM SELECTOR PLAN 2
- Appreciate Oncology input - in light of disease progression, no further chemo recommended, recommend comfort care;  also had family d/w med onc and rads onc - will let us know tomorrow if will pursue further RT, but seem to be leaning towards not.  MOLST form given to family to review, will f/u tomorrow  - stop Tagrisso  - Hospice consents signed

## 2019-12-19 NOTE — DIETITIAN INITIAL EVALUATION ADULT. - OTHER INFO
Pt is 89 year old male PMH lung CA mets to brain/bones s/p radiation/chemotherapy, HTN, BPH presenting with progressive dementia & confusion x1 month, suspected progression of brain metastasis. Per chart, pt started on medical marijuana as appetite stimulate ~2 months ago with chemotherapy however discontinued use in setting of AMS.    Pt awake at time of visit, lunch tray visible with 50% consumption; per RN, pt eating fairly & tolerating texture of meals well. Weight history obtained from chart: (12/12/19) 165 lbs, (10/17/19) 151 lbs, (5/30/19) 161 lbs, (4/3/19) 176 lbs. No noted GI distress, no documented micronutrient supplementation PTA. Pt is 89 year old male PMH lung CA mets to brain/bones s/p radiation/chemotherapy, HTN, BPH presenting with progressive dementia & confusion x1 month, suspected progression of brain metastasis. Per chart, pt started on medical marijuana as appetite stimulate ~2 months ago with chemotherapy however discontinued use in setting of AMS.    Pt awake at time of visit, lunch tray visible with 50% consumption; per RN, pt eating fairly & tolerating texture of meals well. Per wife, pt with fair intake as compared to baseline, supplements intake with Ensure Enlive 2x/day. Wife reports pt with noticeable weight loss over past few months, weight history per chart: (12/12/19) 165 lbs, (10/17/19) 151 lbs, (5/30/19) 161 lbs, (4/3/19) 176 lbs. No noted GI distress, home use of multivitamin. Pt is 89 year old male PMH lung CA mets to brain/bones s/p radiation/chemotherapy, HTN, BPH presenting with progressive dementia & confusion x1 month, suspected progression of brain metastasis. Per chart, pt started on medical marijuana as appetite stimulate ~2 months ago with chemotherapy however discontinued use in setting of AMS. Per chart, hospice referral pending.    Pt awake at time of visit, lunch tray visible with 50% consumption; per RN, pt eating fairly & tolerating texture of meals well. Per wife, pt with fair intake as compared to baseline, supplements intake with Ensure Enlive 2x/day. Wife reports pt with noticeable weight loss over past few months, weight history per chart: (12/12/19) 165 lbs, (10/17/19) 151 lbs, (5/30/19) 161 lbs, (4/3/19) 176 lbs. No noted GI distress, home use of multivitamin.

## 2019-12-19 NOTE — DIETITIAN INITIAL EVALUATION ADULT. - REASON INDICATOR FOR ASSESSMENT
Pt seen for LOS nutrition assessment; subjective information limited in setting of AMS, information obtained from RN & comprehensive chart review. Pt seen for LOS nutrition assessment; information obtained from pt family at bedside, RN & comprehensive chart review.

## 2019-12-19 NOTE — CHART NOTE - NSCHARTNOTEFT_GEN_A_CORE
NUTRITION SERVICES     Upon Nutritional Assessment by the Registered Dietitian your patient was determined to meet criteria/ has evidence of the following diagnosis/diagnoses:  [ ] Mild Protein Calorie Malnutrition   [ ] Moderate Protein Calorie Malnutrition   [ x] Severe Protein Calorie Malnutrition   [ ] Unspecified Protein Calorie Malnutrition   [ ] Underweight / BMI <19  [ ] Morbid Obesity / BMI >40    Findings as based on:  •  Comprehensive nutritional assessment and consultation    Please refer to Initial Dietitian Evaluation via documents section of [email or web address] EMR for further recommendations.

## 2019-12-19 NOTE — PROGRESS NOTE ADULT - SUBJECTIVE AND OBJECTIVE BOX
[facility] Division of Hospital Medicine  Rehana Mccartney MD  Pager (M-F, 8A-5P):  In-house pager [phone]; Long-range pager [phone]  Other Times:  Please page Hospitalist in Charge -  In-house pager [phone]    Patient is a 89y old  Male who presents with a chief complaint of Confusion, agitation (18 Dec 2019 18:46)      SUBJECTIVE / OVERNIGHT EVENTS: No problems reported over night. Pt seen earlier, confused but calm.  Family seen at bedside, open to Hospice.  ADDITIONAL REVIEW OF SYSTEMS:    MEDICATIONS  (STANDING):  amLODIPine   Tablet 2.5 milliGRAM(s) Oral daily  aspirin enteric coated 81 milliGRAM(s) Oral daily  atorvastatin 40 milliGRAM(s) Oral at bedtime  finasteride 5 milliGRAM(s) Oral daily  lisinopril 20 milliGRAM(s) Oral daily  osimertinib 80 milliGRAM(s) Oral daily  tamsulosin 0.4 milliGRAM(s) Oral at bedtime  traZODone 25 milliGRAM(s) Oral <User Schedule>    MEDICATIONS  (PRN):  traZODone 25 milliGRAM(s) Oral at bedtime PRN agitation, insomnia      CAPILLARY BLOOD GLUCOSE        I&O's Summary      PHYSICAL EXAM:  Vital Signs Last 24 Hrs  T(C): 36.4 (19 Dec 2019 05:31), Max: 36.4 (18 Dec 2019 21:08)  T(F): 97.6 (19 Dec 2019 05:31), Max: 97.6 (19 Dec 2019 05:31)  HR: 90 (19 Dec 2019 12:19) (71 - 90)  BP: 85/55 (19 Dec 2019 12:51) (82/47 - 145/66)  BP(mean): --  RR: 18 (19 Dec 2019 12:51) (17 - 18)  SpO2: 98% (19 Dec 2019 12:51) (97% - 100%)    CONSTITUTIONAL: NAD, elderly, frail gentleman, alert, awake, responsive to questioning, communicative, sitting up in chair  RESPIRATORY: Normal respiratory effort; lungs are clear to auscultation bilaterally  CARDIOVASCULAR: Regular rate and rhythm, normal S1 and S2,  No lower extremity edema  ABDOMEN: Nontender to palpation, normoactive bowel sounds  MUSCLOSKELETAL: no clubbing or cyanosis of digits; no joint swelling or tenderness to palpation  PSYCH: calm, oriented to self    LABS:                        13.8   5.67  )-----------( 133      ( 19 Dec 2019 05:40 )             41.7     12-19    140  |  105  |  23  ----------------------------<  87  4.3   |  25  |  1.11    Ca    9.8      19 Dec 2019 05:40  Phos  3.5     12-19  Mg     2.0     12-19                  RADIOLOGY & ADDITIONAL TESTS:  Results Reviewed:   Imaging Personally Reviewed:  Electrocardiogram Personally Reviewed:    COORDINATION OF CARE:  Care Discussed with Consultants/Other Providers [Y/N]: RN, SW, CM, ACP re overall care   Prior or Outpatient Records Reviewed [Y/N]:

## 2019-12-20 ENCOUNTER — INBOUND DOCUMENT (OUTPATIENT)
Age: 84
End: 2019-12-20

## 2019-12-20 VITALS
OXYGEN SATURATION: 99 % | SYSTOLIC BLOOD PRESSURE: 113 MMHG | TEMPERATURE: 98 F | RESPIRATION RATE: 18 BRPM | HEART RATE: 84 BPM | DIASTOLIC BLOOD PRESSURE: 51 MMHG

## 2019-12-20 PROCEDURE — 99239 HOSP IP/OBS DSCHRG MGMT >30: CPT

## 2019-12-20 RX ORDER — FINASTERIDE 5 MG/1
1 TABLET, FILM COATED ORAL
Qty: 0 | Refills: 0 | DISCHARGE

## 2019-12-20 RX ORDER — ATORVASTATIN CALCIUM 80 MG/1
1 TABLET, FILM COATED ORAL
Qty: 0 | Refills: 0 | DISCHARGE

## 2019-12-20 RX ORDER — AMLODIPINE BESYLATE 2.5 MG/1
1 TABLET ORAL
Qty: 0 | Refills: 0 | DISCHARGE

## 2019-12-20 RX ORDER — ATORVASTATIN CALCIUM 80 MG/1
1 TABLET, FILM COATED ORAL
Qty: 30 | Refills: 0
Start: 2019-12-20 | End: 2020-01-18

## 2019-12-20 RX ORDER — QUINAPRIL HYDROCHLORIDE 40 MG/1
1 TABLET, FILM COATED ORAL
Qty: 0 | Refills: 0 | DISCHARGE

## 2019-12-20 RX ORDER — OSIMERTINIB 80 1/1
1 TABLET, FILM COATED ORAL
Qty: 0 | Refills: 0 | DISCHARGE

## 2019-12-20 RX ORDER — TRAZODONE HCL 50 MG
25 TABLET ORAL
Qty: 60 | Refills: 0
Start: 2019-12-20 | End: 2020-01-18

## 2019-12-20 RX ORDER — FINASTERIDE 5 MG/1
1 TABLET, FILM COATED ORAL
Qty: 30 | Refills: 0
Start: 2019-12-20 | End: 2020-01-18

## 2019-12-20 RX ORDER — TRAZODONE HCL 50 MG
25 TABLET ORAL
Qty: 0 | Refills: 0 | DISCHARGE
Start: 2019-12-20

## 2019-12-20 RX ADMIN — FINASTERIDE 5 MILLIGRAM(S): 5 TABLET, FILM COATED ORAL at 12:23

## 2019-12-20 RX ADMIN — Medication 81 MILLIGRAM(S): at 12:23

## 2019-12-20 RX ADMIN — OSIMERTINIB 80 MILLIGRAM(S): 80 TABLET, FILM COATED ORAL at 12:23

## 2019-12-20 NOTE — PROGRESS NOTE ADULT - SUBJECTIVE AND OBJECTIVE BOX
[facility] Division of Hospital Medicine  Rehana Mccartney MD  Pager (M-F, 8A-5P):  In-house pager [phone]; Long-range pager [phone]  Other Times:  Please page Hospitalist in Charge -  In-house pager [phone]    Patient is a 89y old  Male who presents with a chief complaint of Confusion, agitation (19 Dec 2019 18:42)      SUBJECTIVE / OVERNIGHT EVENTS: No problems reported over night.   ADDITIONAL REVIEW OF SYSTEMS:    MEDICATIONS  (STANDING):  aspirin enteric coated 81 milliGRAM(s) Oral daily  atorvastatin 40 milliGRAM(s) Oral at bedtime  finasteride 5 milliGRAM(s) Oral daily  lactated ringers. 500 milliLiter(s) (50 mL/Hr) IV Continuous <Continuous>  osimertinib 80 milliGRAM(s) Oral daily  tamsulosin 0.4 milliGRAM(s) Oral at bedtime  traZODone 25 milliGRAM(s) Oral <User Schedule>    MEDICATIONS  (PRN):  traZODone 25 milliGRAM(s) Oral at bedtime PRN agitation, insomnia      CAPILLARY BLOOD GLUCOSE        I&O's Summary      PHYSICAL EXAM:  Vital Signs Last 24 Hrs  T(C): 36.5 (20 Dec 2019 05:36), Max: 36.6 (19 Dec 2019 21:05)  T(F): 97.7 (20 Dec 2019 05:36), Max: 97.9 (19 Dec 2019 21:05)  HR: 80 (20 Dec 2019 05:36) (74 - 90)  BP: 117/56 (20 Dec 2019 05:36) (82/47 - 119/58)  BP(mean): --  RR: 16 (20 Dec 2019 05:36) (16 - 18)  SpO2: 98% (20 Dec 2019 05:36) (97% - 98%)    CONSTITUTIONAL: NAD, elderly, frail gentleman, alert, awake, responsive to questioning, communicative, sitting up in chair  RESPIRATORY: Normal respiratory effort; lungs are clear to auscultation bilaterally  CARDIOVASCULAR: Regular rate and rhythm, normal S1 and S2,  No lower extremity edema  ABDOMEN: Nontender to palpation, normoactive bowel sounds  MUSCLOSKELETAL: no clubbing or cyanosis of digits; no joint swelling or tenderness to palpation  PSYCH: calm, oriented to self    LABS:                        13.8   5.67  )-----------( 133      ( 19 Dec 2019 05:40 )             41.7     12-19    140  |  105  |  23  ----------------------------<  87  4.3   |  25  |  1.11    Ca    9.8      19 Dec 2019 05:40  Phos  3.5     12-19  Mg     2.0     12-19                  RADIOLOGY & ADDITIONAL TESTS:  Results Reviewed:   Imaging Personally Reviewed:  Electrocardiogram Personally Reviewed:    COORDINATION OF CARE:  Care Discussed with Consultants/Other Providers [Y/N]: RN, SW, CM, ACP re overall care   Prior or Outpatient Records Reviewed [Y/N]: [facility] Division of Hospital Medicine  Rehana Mccartney MD  Pager (M-F, 8A-5P):  In-house pager [phone]; Long-range pager [phone]  Other Times:  Please page Hospitalist in Charge -  In-house pager [phone]    Patient is a 89y old  Male who presents with a chief complaint of Confusion, agitation (19 Dec 2019 18:42)      SUBJECTIVE / OVERNIGHT EVENTS: No problems reported over night. Calm but confused.    ADDITIONAL REVIEW OF SYSTEMS:    MEDICATIONS  (STANDING):  aspirin enteric coated 81 milliGRAM(s) Oral daily  atorvastatin 40 milliGRAM(s) Oral at bedtime  finasteride 5 milliGRAM(s) Oral daily  lactated ringers. 500 milliLiter(s) (50 mL/Hr) IV Continuous <Continuous>  osimertinib 80 milliGRAM(s) Oral daily  tamsulosin 0.4 milliGRAM(s) Oral at bedtime  traZODone 25 milliGRAM(s) Oral <User Schedule>    MEDICATIONS  (PRN):  traZODone 25 milliGRAM(s) Oral at bedtime PRN agitation, insomnia      CAPILLARY BLOOD GLUCOSE        I&O's Summary      PHYSICAL EXAM:  Vital Signs Last 24 Hrs  T(C): 36.5 (20 Dec 2019 05:36), Max: 36.6 (19 Dec 2019 21:05)  T(F): 97.7 (20 Dec 2019 05:36), Max: 97.9 (19 Dec 2019 21:05)  HR: 80 (20 Dec 2019 05:36) (74 - 90)  BP: 117/56 (20 Dec 2019 05:36) (82/47 - 119/58)  BP(mean): --  RR: 16 (20 Dec 2019 05:36) (16 - 18)  SpO2: 98% (20 Dec 2019 05:36) (97% - 98%)    CONSTITUTIONAL: NAD, elderly, frail gentleman, alert, awake, responsive to questioning, communicative, sitting up in chair  RESPIRATORY: Normal respiratory effort; lungs are clear to auscultation bilaterally  CARDIOVASCULAR: Regular rate and rhythm, normal S1 and S2,  No lower extremity edema  ABDOMEN: Nontender to palpation, normoactive bowel sounds  MUSCLOSKELETAL: no clubbing or cyanosis of digits; no joint swelling or tenderness to palpation  PSYCH: calm, oriented to self    LABS:                        13.8   5.67  )-----------( 133      ( 19 Dec 2019 05:40 )             41.7     12-19    140  |  105  |  23  ----------------------------<  87  4.3   |  25  |  1.11    Ca    9.8      19 Dec 2019 05:40  Phos  3.5     12-19  Mg     2.0     12-19                  RADIOLOGY & ADDITIONAL TESTS:  Results Reviewed:   Imaging Personally Reviewed:  Electrocardiogram Personally Reviewed:    COORDINATION OF CARE:  Care Discussed with Consultants/Other Providers [Y/N]: RN, SW, CM, ACP re overall care   Prior or Outpatient Records Reviewed [Y/N]:

## 2019-12-20 NOTE — PROGRESS NOTE ADULT - ATTENDING COMMENTS
aim for d/c with Hospice stable for d/c home with Hospice today.    Spent 35 minutes counseling and coordinating discharge care.

## 2019-12-20 NOTE — PROGRESS NOTE ADULT - PROBLEM SELECTOR PLAN 3
stop  Norvasc, Lisinopril  bp low - responded to fluid, likely dehydrated, stop  Norvasc, Lisinopril  bp low - responded to fluid, likely dehydrated

## 2019-12-20 NOTE — PROGRESS NOTE ADULT - PROBLEM SELECTOR PLAN 2
- Appreciate Oncology input - in light of disease progression, no further chemo recommended, recommend comfort care;  also had family d/w med onc and rads onc - will let us know tomorrow if will pursue further RT, but seem to be leaning towards not.  MOLST form given to family to review, will f/u tomorrow  - stop [place]  - Hospice consents signed Appreciate Oncology input - in light of disease progression, no further chemo recommended, recommend comfort care;  also had family d/w med onc and rads onc.  Family has opted for Hospice care.  MOLST form given to family to review, will f/u  - stop Tagrisso  - Hospice consents signed

## 2019-12-20 NOTE — PROGRESS NOTE ADULT - REASON FOR ADMISSION
Confusion, agitation

## 2019-12-20 NOTE — PROGRESS NOTE ADULT - PROBLEM SELECTOR PLAN 6
1) PCP Contacted on Admission: [ x ] Y     [  ] N     [  ] N/A - [facility] - Dr. Lama  2) Date of Contact with PCP: 12/13  3) PCP Contacted at Discharge:  [  ] Y    [  ] N    [  ] N/A -   4) Summary of Handoff Given to PCP:   5) Post-Discharge Appointment Date and Location:

## 2023-10-04 NOTE — PATIENT PROFILE ADULT - NSPROGENDIFFINTUB_GEN_A_NUR
Encounter addended by: Melissa Sosa RN on: 10/4/2023 10:20 AM   Actions taken: Charge Capture section accepted
never intubated

## 2024-06-10 NOTE — DISCHARGE NOTE PROVIDER - NSDCHHNEEDSERVICE_GEN_ALL_CORE
Hpi Title: Evaluation of Skin Lesions Additional History: Spots do not itch or hurt Medication teaching and assessment/Rehabilitation services/Observation and assessment
